# Patient Record
Sex: FEMALE | Race: OTHER | HISPANIC OR LATINO | URBAN - METROPOLITAN AREA
[De-identification: names, ages, dates, MRNs, and addresses within clinical notes are randomized per-mention and may not be internally consistent; named-entity substitution may affect disease eponyms.]

---

## 2017-03-23 ENCOUNTER — EMERGENCY (EMERGENCY)
Facility: HOSPITAL | Age: 67
LOS: 1 days | Discharge: HOME | End: 2017-03-23
Attending: EMERGENCY MEDICINE
Payer: MEDICAID

## 2017-03-23 VITALS
HEIGHT: 62 IN | RESPIRATION RATE: 20 BRPM | WEIGHT: 179.9 LBS | DIASTOLIC BLOOD PRESSURE: 78 MMHG | SYSTOLIC BLOOD PRESSURE: 153 MMHG | TEMPERATURE: 98 F | OXYGEN SATURATION: 97 % | HEART RATE: 74 BPM

## 2017-03-23 DIAGNOSIS — R05 COUGH: ICD-10-CM

## 2017-03-23 DIAGNOSIS — E11.9 TYPE 2 DIABETES MELLITUS WITHOUT COMPLICATIONS: ICD-10-CM

## 2017-03-23 DIAGNOSIS — E78.00 PURE HYPERCHOLESTEROLEMIA, UNSPECIFIED: ICD-10-CM

## 2017-03-23 DIAGNOSIS — E03.9 HYPOTHYROIDISM, UNSPECIFIED: ICD-10-CM

## 2017-03-23 PROCEDURE — 99283 EMERGENCY DEPT VISIT LOW MDM: CPT | Mod: 25

## 2017-03-23 PROCEDURE — T1013: CPT

## 2017-03-23 PROCEDURE — 94640 AIRWAY INHALATION TREATMENT: CPT

## 2017-03-23 PROCEDURE — 99284 EMERGENCY DEPT VISIT MOD MDM: CPT | Mod: 25

## 2017-03-23 RX ORDER — ALBUTEROL 90 UG/1
2.5 AEROSOL, METERED ORAL ONCE
Qty: 0 | Refills: 0 | Status: COMPLETED | OUTPATIENT
Start: 2017-03-23 | End: 2017-03-23

## 2017-03-23 RX ORDER — ALBUTEROL 90 UG/1
2 AEROSOL, METERED ORAL
Qty: 1 | Refills: 0 | OUTPATIENT
Start: 2017-03-23

## 2017-03-23 RX ADMIN — ALBUTEROL 2.5 MILLIGRAM(S): 90 AEROSOL, METERED ORAL at 02:43

## 2017-03-23 NOTE — ED PROVIDER NOTE - OBJECTIVE STATEMENT
65 y/o female complaining of itchy throat/cough/congestion/aches for 2 weeks, denies fever/chills/abd pain/back pain/CP/SOB/palpitations/anorexia. States she went to PCP today and was placed on Zpack today.

## 2017-03-23 NOTE — ED PROVIDER NOTE - ATTENDING CONTRIBUTION TO CARE
65 yo F presents to ED c/o dry cough, nasal congestion and itchy throat x 2 weeks.  Pt see in clinic and Rx'd with Z-dioni without relief of cough.  On exam afebrile, pt in no acute resp distress.  Lungs with rare occ exp wheeze.  Rx neb, continue Z-dioni and Rx Tessalon

## 2017-03-23 NOTE — ED ADULT NURSE NOTE - OBJECTIVE STATEMENT
pt A&Ox3, fever/chills, productive cough with green phlegm , difficulty breathing, headache and generalized bidy aches x2 weeks went to clinic today and was given zpack symptoms  increased tonight

## 2017-03-23 NOTE — ED ADULT TRIAGE NOTE - CHIEF COMPLAINT QUOTE
patient states having cough and chills that started 2 weeks ago. patient weas seen at clinic and started on z-pack but states feeling worse

## 2017-05-04 ENCOUNTER — EMERGENCY (EMERGENCY)
Facility: HOSPITAL | Age: 67
LOS: 1 days | Discharge: HOME | End: 2017-05-04
Attending: EMERGENCY MEDICINE
Payer: MEDICAID

## 2017-05-04 VITALS
DIASTOLIC BLOOD PRESSURE: 76 MMHG | OXYGEN SATURATION: 98 % | WEIGHT: 184.09 LBS | HEIGHT: 62 IN | TEMPERATURE: 98 F | SYSTOLIC BLOOD PRESSURE: 156 MMHG | HEART RATE: 76 BPM | RESPIRATION RATE: 20 BRPM

## 2017-05-04 DIAGNOSIS — M25.531 PAIN IN RIGHT WRIST: ICD-10-CM

## 2017-05-04 DIAGNOSIS — E03.9 HYPOTHYROIDISM, UNSPECIFIED: ICD-10-CM

## 2017-05-04 DIAGNOSIS — M65.4 RADIAL STYLOID TENOSYNOVITIS [DE QUERVAIN]: ICD-10-CM
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
## 2017-05-04 DIAGNOSIS — I10 ESSENTIAL (PRIMARY) HYPERTENSION: ICD-10-CM
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
## 2017-05-04 DIAGNOSIS — E78.00 PURE HYPERCHOLESTEROLEMIA, UNSPECIFIED: ICD-10-CM

## 2017-05-04 PROCEDURE — 73110 X-RAY EXAM OF WRIST: CPT

## 2017-05-04 PROCEDURE — 73110 X-RAY EXAM OF WRIST: CPT | Mod: 26,RT

## 2017-05-04 PROCEDURE — 29125 APPL SHORT ARM SPLINT STATIC: CPT

## 2017-05-04 PROCEDURE — T1013: CPT

## 2017-05-04 PROCEDURE — 99283 EMERGENCY DEPT VISIT LOW MDM: CPT | Mod: 25

## 2017-05-04 PROCEDURE — 29125 APPL SHORT ARM SPLINT STATIC: CPT | Mod: RT

## 2017-05-04 RX ORDER — IBUPROFEN 200 MG
600 TABLET ORAL ONCE
Qty: 0 | Refills: 0 | Status: COMPLETED | OUTPATIENT
Start: 2017-05-04 | End: 2017-05-04

## 2017-05-04 RX ADMIN — Medication 600 MILLIGRAM(S): at 18:29

## 2017-05-04 NOTE — ED STATDOCS - PROGRESS NOTE DETAILS
Pts xrays negative for acute bony pathology--spica velcro splint applied for comfort. Pt given ortho to f/u with and is stable for d/c.

## 2017-05-04 NOTE — ED STATDOCS - OBJECTIVE STATEMENT
65 y/o F pt w/ PMHx of HTN and DM presents to the ED c/o R wrist pain onset yesterday. Pt states that she felt the onset of sx while in the shower. Pt denies trauma, fall, numbness/tingling, focal weakness, deformity, or any other complaints. NKDA.

## 2017-05-04 NOTE — ED ADULT TRIAGE NOTE - CHIEF COMPLAINT QUOTE
Patient arrived to ED today with c/o right wrist pain, and swelling.  Patient arrived from [facility] care for evaluation.

## 2017-05-04 NOTE — ED STATDOCS - NS ED MD SCRIBE ATTENDING SCRIBE SECTIONS
HIV/VITAL SIGNS( Pullset)/REVIEW OF SYSTEMS/DISPOSITION/PAST MEDICAL/SURGICAL/SOCIAL HISTORY/PHYSICAL EXAM/HISTORY OF PRESENT ILLNESS

## 2017-05-04 NOTE — ED PROCEDURE NOTE - CPROC ED POST PROC CARE GUIDE1
Instructed patient/caregiver to follow-up with primary care physician./Elevate the injured extremity as instructed./Verbal/written post procedure instructions were given to patient/caregiver./Instructed patient/caregiver regarding signs and symptoms of infection./Keep the cast/splint/dressing clean and dry.

## 2017-05-04 NOTE — ED STATDOCS - ATTENDING CONTRIBUTION TO CARE
I, Frank Cardenas, performed the initial face to face bedside interview with this patient regarding history of present illness, review of symptoms and relevant past medical, social and family history.  I completed an independent physical examination.  I was the initial provider who evaluated this patient. I have signed out the follow up of any pending tests (i.e. labs, radiological studies) to the ACP.  I have communicated the patient’s plan of care and disposition with the ACP.  The history, relevant review of systems, past medical and surgical history, medical decision making, and physical examination was documented by the scribe in my presence and I attest to the accuracy of the documentation.

## 2017-05-11 ENCOUNTER — APPOINTMENT (OUTPATIENT)
Dept: ORTHOPEDIC SURGERY | Facility: CLINIC | Age: 67
End: 2017-05-11

## 2017-07-22 ENCOUNTER — EMERGENCY (EMERGENCY)
Facility: HOSPITAL | Age: 67
LOS: 1 days | End: 2017-07-22
Attending: EMERGENCY MEDICINE | Admitting: EMERGENCY MEDICINE
Payer: MEDICAID

## 2017-07-22 VITALS
OXYGEN SATURATION: 100 % | DIASTOLIC BLOOD PRESSURE: 76 MMHG | TEMPERATURE: 98 F | HEIGHT: 61 IN | RESPIRATION RATE: 18 BRPM | HEART RATE: 68 BPM | WEIGHT: 182.1 LBS | SYSTOLIC BLOOD PRESSURE: 153 MMHG

## 2017-07-22 PROCEDURE — 99284 EMERGENCY DEPT VISIT MOD MDM: CPT | Mod: 25

## 2017-07-22 NOTE — ED ADULT TRIAGE NOTE - CHIEF COMPLAINT QUOTE
pt c/o fall tripped in the shoes, fell on right shoulder and hit her head, happened about 11 am denies blood thinners

## 2017-07-23 VITALS
OXYGEN SATURATION: 98 % | DIASTOLIC BLOOD PRESSURE: 64 MMHG | TEMPERATURE: 98 F | HEART RATE: 71 BPM | RESPIRATION RATE: 18 BRPM | SYSTOLIC BLOOD PRESSURE: 143 MMHG

## 2017-07-23 PROCEDURE — 96372 THER/PROPH/DIAG INJ SC/IM: CPT

## 2017-07-23 PROCEDURE — 73000 X-RAY EXAM OF COLLAR BONE: CPT

## 2017-07-23 PROCEDURE — 73030 X-RAY EXAM OF SHOULDER: CPT

## 2017-07-23 PROCEDURE — 73000 X-RAY EXAM OF COLLAR BONE: CPT | Mod: 26,RT

## 2017-07-23 PROCEDURE — 73030 X-RAY EXAM OF SHOULDER: CPT | Mod: 26,RT

## 2017-07-23 PROCEDURE — 99284 EMERGENCY DEPT VISIT MOD MDM: CPT | Mod: 25

## 2017-07-23 RX ORDER — LOSARTAN POTASSIUM 100 MG/1
1 TABLET, FILM COATED ORAL
Qty: 0 | Refills: 0 | COMMUNITY

## 2017-07-23 RX ORDER — TRAMADOL HYDROCHLORIDE 50 MG/1
1 TABLET ORAL
Qty: 9 | Refills: 0 | OUTPATIENT
Start: 2017-07-23 | End: 2017-07-26

## 2017-07-23 RX ORDER — KETOROLAC TROMETHAMINE 30 MG/ML
30 SYRINGE (ML) INJECTION ONCE
Qty: 0 | Refills: 0 | Status: DISCONTINUED | OUTPATIENT
Start: 2017-07-23 | End: 2017-07-23

## 2017-07-23 RX ORDER — LEVOTHYROXINE SODIUM 125 MCG
1 TABLET ORAL
Qty: 0 | Refills: 0 | COMMUNITY

## 2017-07-23 RX ORDER — CYCLOBENZAPRINE HYDROCHLORIDE 10 MG/1
1 TABLET, FILM COATED ORAL
Qty: 9 | Refills: 0 | OUTPATIENT
Start: 2017-07-23 | End: 2017-07-26

## 2017-07-23 RX ORDER — OXYCODONE HYDROCHLORIDE 5 MG/1
5 TABLET ORAL ONCE
Qty: 0 | Refills: 0 | Status: DISCONTINUED | OUTPATIENT
Start: 2017-07-23 | End: 2017-07-23

## 2017-07-23 RX ORDER — CYCLOBENZAPRINE HYDROCHLORIDE 10 MG/1
10 TABLET, FILM COATED ORAL ONCE
Qty: 0 | Refills: 0 | Status: COMPLETED | OUTPATIENT
Start: 2017-07-23 | End: 2017-07-23

## 2017-07-23 RX ADMIN — Medication 30 MILLIGRAM(S): at 03:02

## 2017-07-23 RX ADMIN — CYCLOBENZAPRINE HYDROCHLORIDE 10 MILLIGRAM(S): 10 TABLET, FILM COATED ORAL at 03:02

## 2017-07-23 RX ADMIN — OXYCODONE HYDROCHLORIDE 5 MILLIGRAM(S): 5 TABLET ORAL at 03:02

## 2017-07-23 NOTE — ED PROVIDER NOTE - OBJECTIVE STATEMENT
66 y/o F PMHx DM presents to ED c/o R shoulder pain s/p mechanical fall. Pt does not speak english, son is translating. Son reports mother tripped and fell around 1100, was able to ambulate on her own. Pt believed pain would subside, came to ED due to pain. Denies kidney problems, LOC, head trauma, N/V/D, fever, chills, SOB, CP, difficulty breathing, HA, numbness, tingling and abd pain.

## 2017-07-23 NOTE — ED PROVIDER NOTE - NS ED ROS FT
no weight change, no fever or chills  no rash, no bruises  no visual changes no eye discharge  no cough cold or congestion,   no sob, no chest pain  no orthopnea, no pnd  no abd pain, no n/v/d  no hematuria, no change in urinary habits  positive joint pain, no deformity  no headache, no paresthesia   positive R shoulder pain

## 2017-07-23 NOTE — ED PROVIDER NOTE - PHYSICAL EXAMINATION
sitting comfortably, talking in full sentences  pupilsa reactive, eomi,   mm moist, no oral injury, no facial pain  supple, no c/t/l spine tenderness, from, trachea in midline  H[place]reet chest expansion, no cheat wall tenderness, no rib tenderness, no deformity, no clavicular pain  S1 S2 distant  abd soft, non tender, no g/r,   no pelvic pain  moves all ext, no swelling noted  Neuro aao3, cn intact grossly, no focal deficits  skin no bruises, no swelling   tenderness to proximal humerus. distal sensory capillary intact. no hematoma.

## 2018-08-02 ENCOUNTER — APPOINTMENT (OUTPATIENT)
Dept: GASTROENTEROLOGY | Facility: CLINIC | Age: 68
End: 2018-08-02
Payer: MEDICAID

## 2018-08-02 VITALS
HEART RATE: 76 BPM | RESPIRATION RATE: 16 BRPM | OXYGEN SATURATION: 97 % | WEIGHT: 166 LBS | SYSTOLIC BLOOD PRESSURE: 124 MMHG | DIASTOLIC BLOOD PRESSURE: 66 MMHG | BODY MASS INDEX: 31.34 KG/M2 | HEIGHT: 61 IN

## 2018-08-02 PROBLEM — I10 ESSENTIAL (PRIMARY) HYPERTENSION: Chronic | Status: ACTIVE | Noted: 2017-05-05

## 2018-08-02 PROCEDURE — 82272 OCCULT BLD FECES 1-3 TESTS: CPT

## 2018-08-02 PROCEDURE — 99204 OFFICE O/P NEW MOD 45 MIN: CPT

## 2018-08-02 RX ORDER — POLYETHYLENE GLYCOL 3350 17 G/17G
17 POWDER, FOR SOLUTION ORAL DAILY
Qty: 3 | Refills: 1 | Status: ACTIVE | COMMUNITY
Start: 2018-08-02 | End: 1900-01-01

## 2018-09-14 ENCOUNTER — RESULT REVIEW (OUTPATIENT)
Age: 68
End: 2018-09-14

## 2018-10-15 ENCOUNTER — APPOINTMENT (OUTPATIENT)
Dept: GASTROENTEROLOGY | Facility: GI CENTER | Age: 68
End: 2018-10-15
Payer: MEDICAID

## 2018-10-15 ENCOUNTER — OUTPATIENT (OUTPATIENT)
Dept: OUTPATIENT SERVICES | Facility: HOSPITAL | Age: 68
LOS: 1 days | End: 2018-10-15
Payer: MEDICAID

## 2018-10-15 DIAGNOSIS — K59.01 SLOW TRANSIT CONSTIPATION: ICD-10-CM

## 2018-10-15 DIAGNOSIS — Z12.11 ENCOUNTER FOR SCREENING FOR MALIGNANT NEOPLASM OF COLON: ICD-10-CM

## 2018-10-15 LAB — GLUCOSE BLDC GLUCOMTR-MCNC: 188 MG/DL — HIGH (ref 70–99)

## 2018-10-15 PROCEDURE — G0121: CPT

## 2018-10-15 PROCEDURE — 82962 GLUCOSE BLOOD TEST: CPT

## 2018-10-15 PROCEDURE — 45378 DIAGNOSTIC COLONOSCOPY: CPT

## 2018-10-29 ENCOUNTER — RESULT REVIEW (OUTPATIENT)
Age: 68
End: 2018-10-29

## 2018-10-29 ENCOUNTER — OUTPATIENT (OUTPATIENT)
Dept: OUTPATIENT SERVICES | Facility: HOSPITAL | Age: 68
LOS: 1 days | End: 2018-10-29
Payer: MEDICAID

## 2018-10-29 ENCOUNTER — APPOINTMENT (OUTPATIENT)
Dept: GASTROENTEROLOGY | Facility: GI CENTER | Age: 68
End: 2018-10-29
Payer: MEDICAID

## 2018-10-29 DIAGNOSIS — K21.9 GASTRO-ESOPHAGEAL REFLUX DISEASE WITHOUT ESOPHAGITIS: ICD-10-CM

## 2018-10-29 DIAGNOSIS — Z00.00 ENCOUNTER FOR GENERAL ADULT MEDICAL EXAMINATION W/OUT ABNORMAL FINDINGS: ICD-10-CM

## 2018-10-29 LAB — GLUCOSE BLDC GLUCOMTR-MCNC: 146 MG/DL — HIGH (ref 70–99)

## 2018-10-29 PROCEDURE — 82962 GLUCOSE BLOOD TEST: CPT

## 2018-10-29 PROCEDURE — 43239 EGD BIOPSY SINGLE/MULTIPLE: CPT

## 2018-10-29 PROCEDURE — 88305 TISSUE EXAM BY PATHOLOGIST: CPT

## 2018-10-29 PROCEDURE — 88305 TISSUE EXAM BY PATHOLOGIST: CPT | Mod: 26

## 2018-10-29 PROCEDURE — 88342 IMHCHEM/IMCYTCHM 1ST ANTB: CPT | Mod: 26

## 2018-10-29 PROCEDURE — 88342 IMHCHEM/IMCYTCHM 1ST ANTB: CPT

## 2018-10-31 LAB — SURGICAL PATHOLOGY FINAL REPORT - CH: SIGNIFICANT CHANGE UP

## 2019-09-18 ENCOUNTER — RESULT REVIEW (OUTPATIENT)
Age: 69
End: 2019-09-18

## 2019-09-19 ENCOUNTER — APPOINTMENT (OUTPATIENT)
Dept: DERMATOLOGY | Facility: CLINIC | Age: 69
End: 2019-09-19
Payer: MEDICAID

## 2019-09-19 PROCEDURE — 99202 OFFICE O/P NEW SF 15 MIN: CPT | Mod: 25

## 2019-09-19 PROCEDURE — 11102 TANGNTL BX SKIN SINGLE LES: CPT

## 2020-02-20 ENCOUNTER — APPOINTMENT (OUTPATIENT)
Dept: DERMATOLOGY | Facility: CLINIC | Age: 70
End: 2020-02-20

## 2020-03-05 ENCOUNTER — APPOINTMENT (OUTPATIENT)
Dept: DERMATOLOGY | Facility: CLINIC | Age: 70
End: 2020-03-05
Payer: MEDICAID

## 2020-03-05 PROCEDURE — 99213 OFFICE O/P EST LOW 20 MIN: CPT

## 2020-06-04 ENCOUNTER — APPOINTMENT (OUTPATIENT)
Dept: DERMATOLOGY | Facility: CLINIC | Age: 70
End: 2020-06-04
Payer: MEDICAID

## 2020-06-04 DIAGNOSIS — C44.311 BASAL CELL CARCINOMA OF SKIN OF NOSE: ICD-10-CM

## 2020-06-04 PROCEDURE — 13152 CMPLX RPR E/N/E/L 2.6-7.5 CM: CPT

## 2020-06-04 PROCEDURE — 17312 MOHS ADDL STAGE: CPT

## 2020-06-04 PROCEDURE — 17311 MOHS 1 STAGE H/N/HF/G: CPT

## 2020-12-16 PROBLEM — Z12.11 ENCOUNTER FOR SCREENING COLONOSCOPY: Status: RESOLVED | Noted: 2018-10-15 | Resolved: 2020-12-16

## 2021-06-15 ENCOUNTER — APPOINTMENT (OUTPATIENT)
Dept: GASTROENTEROLOGY | Facility: CLINIC | Age: 71
End: 2021-06-15
Payer: MEDICAID

## 2021-06-15 VITALS
HEIGHT: 61 IN | SYSTOLIC BLOOD PRESSURE: 150 MMHG | DIASTOLIC BLOOD PRESSURE: 86 MMHG | WEIGHT: 159 LBS | OXYGEN SATURATION: 98 % | RESPIRATION RATE: 16 BRPM | HEART RATE: 92 BPM | TEMPERATURE: 97.7 F | BODY MASS INDEX: 30.02 KG/M2

## 2021-06-15 DIAGNOSIS — Z78.9 OTHER SPECIFIED HEALTH STATUS: ICD-10-CM

## 2021-06-15 DIAGNOSIS — K21.9 GASTRO-ESOPHAGEAL REFLUX DISEASE W/OUT ESOPHAGITIS: ICD-10-CM

## 2021-06-15 DIAGNOSIS — K59.01 SLOW TRANSIT CONSTIPATION: ICD-10-CM

## 2021-06-15 DIAGNOSIS — E11.9 TYPE 2 DIABETES MELLITUS W/OUT COMPLICATIONS: ICD-10-CM

## 2021-06-15 PROCEDURE — 99214 OFFICE O/P EST MOD 30 MIN: CPT

## 2021-06-15 NOTE — HISTORY OF PRESENT ILLNESS
[de-identified] : The patient arrived along with her niece for further evaluation.  She has been complaining of significant acid reflux and has been on omeprazole 40 mg.  She has been evaluated in the past by Dr. Estrellita Iglesias and has undergone a EGD and colonoscopy in 2018.  10-year follow-up of colonoscopy was recommended.  EGD revealed some gastritis with no evidence of any Helicobacter pylori infection.  The patient is denying any weight loss.  She is on medications for diabetes.  It is unclear whether she has a good control of diabetes or not.  She has occasional vomiting.  She also has constipation.  She is taking medications for constipation.

## 2021-06-15 NOTE — PHYSICAL EXAM
[General Appearance - Alert] : alert [General Appearance - In No Acute Distress] : in no acute distress [Sclera] : the sclera and conjunctiva were normal [PERRL With Normal Accommodation] : pupils were equal in size, round, and reactive to light [Extraocular Movements] : extraocular movements were intact [Oropharynx] : the oropharynx was normal [Outer Ear] : the ears and nose were normal in appearance [Neck Appearance] : the appearance of the neck was normal [Neck Cervical Mass (___cm)] : no neck mass was observed [Jugular Venous Distention Increased] : there was no jugular-venous distention [Thyroid Diffuse Enlargement] : the thyroid was not enlarged [Thyroid Nodule] : there were no palpable thyroid nodules [Auscultation Breath Sounds / Voice Sounds] : lungs were clear to auscultation bilaterally [Heart Rate And Rhythm] : heart rate was normal and rhythm regular [Heart Sounds] : normal S1 and S2 [Heart Sounds Gallop] : no gallops [Murmurs] : no murmurs [Heart Sounds Pericardial Friction Rub] : no pericardial rub [Arterial Pulses Carotid] : carotid pulses were normal with no bruits [Abdominal Aorta] : the abdominal aorta was normal [Bowel Sounds] : normal bowel sounds [Abdomen Soft] : soft [Abdomen Tenderness] : non-tender [Abdomen Mass (___ Cm)] : no abdominal mass palpated [Cervical Lymph Nodes Enlarged Anterior Bilaterally] : anterior cervical [Cervical Lymph Nodes Enlarged Posterior Bilaterally] : posterior cervical [No CVA Tenderness] : no ~M costovertebral angle tenderness [No Spinal Tenderness] : no spinal tenderness [Abnormal Walk] : normal gait [Musculoskeletal - Swelling] : no joint swelling seen [Nail Clubbing] : no clubbing  or cyanosis of the fingernails [Motor Tone] : muscle strength and tone were normal [Skin Color & Pigmentation] : normal skin color and pigmentation [Skin Turgor] : normal skin turgor [] : no rash [No Focal Deficits] : no focal deficits [Oriented To Time, Place, And Person] : oriented to person, place, and time [Impaired Insight] : insight and judgment were intact [Affect] : the affect was normal

## 2021-06-15 NOTE — ASSESSMENT
[FreeTextEntry1] : At this time, I have recommended to consider the evaluation for appropriate glycemic control and also perform the blood testing.  I have also recommended to consider nuclear medicine gastric emptying study to rule out any gastroparesis.  In the interim the patient should continue the PPI therapy.  If she however is doing okay with the glycemic control and there is no evidence of any gastroparesis, she may need a repeat upper endoscopy.  I may have to add PPI therapy along with H2 blocker therapy.  We will follow up on a short-term basis.

## 2021-06-29 ENCOUNTER — OUTPATIENT (OUTPATIENT)
Dept: OUTPATIENT SERVICES | Facility: HOSPITAL | Age: 71
LOS: 1 days | End: 2021-06-29
Payer: MEDICAID

## 2021-06-29 DIAGNOSIS — E11.9 TYPE 2 DIABETES MELLITUS WITHOUT COMPLICATIONS: ICD-10-CM

## 2021-06-29 DIAGNOSIS — K21.9 GASTRO-ESOPHAGEAL REFLUX DISEASE WITHOUT ESOPHAGITIS: ICD-10-CM

## 2021-06-29 LAB — GLUCOSE BLDC GLUCOMTR-MCNC: 126 MG/DL — HIGH (ref 70–99)

## 2021-06-29 PROCEDURE — 82962 GLUCOSE BLOOD TEST: CPT

## 2021-06-29 PROCEDURE — 78264 GASTRIC EMPTYING IMG STUDY: CPT | Mod: 26,GC

## 2021-06-29 PROCEDURE — 78264 GASTRIC EMPTYING IMG STUDY: CPT

## 2021-06-29 PROCEDURE — A9541: CPT

## 2021-07-08 ENCOUNTER — OUTPATIENT (OUTPATIENT)
Dept: OUTPATIENT SERVICES | Facility: HOSPITAL | Age: 71
LOS: 1 days | End: 2021-07-08
Payer: MEDICAID

## 2021-07-08 ENCOUNTER — APPOINTMENT (OUTPATIENT)
Dept: INFECTIOUS DISEASE | Facility: CLINIC | Age: 71
End: 2021-07-08
Payer: MEDICAID

## 2021-07-08 VITALS
DIASTOLIC BLOOD PRESSURE: 48 MMHG | HEART RATE: 76 BPM | SYSTOLIC BLOOD PRESSURE: 136 MMHG | TEMPERATURE: 98.8 F | OXYGEN SATURATION: 96 %

## 2021-07-08 DIAGNOSIS — N39.0 URINARY TRACT INFECTION, SITE NOT SPECIFIED: ICD-10-CM

## 2021-07-08 DIAGNOSIS — B97.89 OTHER VIRAL AGENTS AS THE CAUSE OF DISEASES CLASSIFIED ELSEWHERE: ICD-10-CM

## 2021-07-08 PROCEDURE — 99205 OFFICE O/P NEW HI 60 MIN: CPT

## 2021-07-08 PROCEDURE — G0463: CPT

## 2021-07-08 RX ORDER — FOSFOMYCIN TROMETHAMINE 3 G/1
3 POWDER ORAL
Qty: 2 | Refills: 0 | Status: ACTIVE | COMMUNITY
Start: 2021-07-08 | End: 1900-01-01

## 2021-07-08 NOTE — ASSESSMENT
[FreeTextEntry1] : Will try Fosfamycin for now, if she becomes asymptomatic after treatment, will wait until she has symptoms otherwise no need to repeat UA or UC. If fosfamycin not covered will send Microbid(in UC showed that it is sensitive) If she developes symptoms and has UTI again, then we will put her on prophylaxis with microbid. \par At any point if she has fever, flank pain or any new symptoms need to go to ED. \par Seen GYn and , with no issue such as prolapse or rectocele/ureterocele \par Patient was given the opportunity to ask questions and all questions were answered to their satisfaction.\par Counseling included lab results, differential diagnosis, treatment options, risks and benefits, lifestyle changes, current condition, medications and dose adjustments.\par The patient verbalized understanding.\par  [Treatment Education] : treatment education [Treatment Adherence] : treatment adherence [Rx Dose / Side Effects] : Rx dose/side effects [Risk Reduction] : risk reduction [Drug Interactions / Side Effects] : drug interactions/side effects [Disclosure of Diagnosis] : disclosure of diagnosis [Anticipatory Guidance] : anticipatory guidance

## 2021-07-08 NOTE — HISTORY OF PRESENT ILLNESS
[FreeTextEntry1] : 72y/o [place]ese speaking woman (accompanied by her niece . asking to interpret for us), with PMH of DM2 and GERD was referred to ID with recurrent UTI. \par She was hospitalized with UTI ESBL requiring IV antibiotics for which stayed in the hospital for 3-4 days and Continued Antibiotics (assuming carbapenem?) at home for total 7days at home. She had dysuria and suprapubic pain. As per niece had a negative abdominal CT but UA and UC were ESBL ecoli. \par UA and UC was repeated on 6/25 by  with high WBC in urine and Ecoli ESBL again so she was referred to ID. \par She has no fever or GI symptoms. States that sometimes has suprapubic pain with frequency. \par No smoking, ETOH or drugs \par

## 2021-09-02 RX ORDER — NITROFURANTOIN MACROCRYSTALS 100 MG/1
100 CAPSULE ORAL
Qty: 14 | Refills: 0 | Status: ACTIVE | COMMUNITY
Start: 2021-09-02 | End: 1900-01-01

## 2021-09-29 ENCOUNTER — NON-APPOINTMENT (OUTPATIENT)
Age: 71
End: 2021-09-29

## 2021-09-30 ENCOUNTER — OUTPATIENT (OUTPATIENT)
Dept: OUTPATIENT SERVICES | Facility: HOSPITAL | Age: 71
LOS: 1 days | End: 2021-09-30
Payer: MEDICAID

## 2021-09-30 ENCOUNTER — APPOINTMENT (OUTPATIENT)
Dept: INFECTIOUS DISEASE | Facility: CLINIC | Age: 71
End: 2021-09-30
Payer: MEDICAID

## 2021-09-30 VITALS
TEMPERATURE: 96.6 F | WEIGHT: 155 LBS | DIASTOLIC BLOOD PRESSURE: 79 MMHG | HEIGHT: 61 IN | OXYGEN SATURATION: 98 % | HEART RATE: 78 BPM | SYSTOLIC BLOOD PRESSURE: 158 MMHG | BODY MASS INDEX: 29.27 KG/M2

## 2021-09-30 DIAGNOSIS — B97.89 OTHER VIRAL AGENTS AS THE CAUSE OF DISEASES CLASSIFIED ELSEWHERE: ICD-10-CM

## 2021-09-30 PROCEDURE — 99213 OFFICE O/P EST LOW 20 MIN: CPT

## 2021-09-30 RX ORDER — FOSFOMYCIN TROMETHAMINE 3 G/1
3 POWDER ORAL
Qty: 1 | Refills: 0 | Status: ACTIVE | COMMUNITY
Start: 2021-09-30 | End: 1900-01-01

## 2021-09-30 RX ORDER — NITROFURANTOIN (MONOHYDRATE/MACROCRYSTALS) 25; 75 MG/1; MG/1
100 CAPSULE ORAL DAILY
Qty: 30 | Refills: 1 | Status: ACTIVE | COMMUNITY
Start: 2021-09-30 | End: 1900-01-01

## 2021-09-30 NOTE — ASSESSMENT
[FreeTextEntry1] : Advised about repeating IV ertapenem for 7days or so but at this point she declined and wants to try an oral option. \par Will start with one dose of Fosfamycin 3gm and then daily Nitrofurantoin 100mg daily, will see her in 6 weeks for follow up.\par Side effects discussed.\par At any time has fever or flank or midback pain needs to go to ED. \par If with this treatment still has symptomatic ESBL Ecoli, recommend treating with IV. \par \par Patient was given the opportunity to ask questions and all questions were answered to their satisfaction.\par Counseling included lab results, differential diagnosis, treatment options, risks and benefits, lifestyle changes, current condition, medications and dose adjustments.\par The patient verbalized understanding.\par  [Treatment Education] : treatment education [Treatment Adherence] : treatment adherence [Rx Dose / Side Effects] : Rx dose/side effects [Risk Reduction] : risk reduction [Universal Precautions] : universal precautions [Drug Interactions / Side Effects] : drug interactions/side effects [Anticipatory Guidance] : anticipatory guidance

## 2021-09-30 NOTE — HISTORY OF PRESENT ILLNESS
[FreeTextEntry1] : 70y/o American speaking woman (accompanied by her niece . asking to interpret for us), with PMH of DM2 and GERD was referred to ID with recurrent UTI. \par She was hospitalized with UTI ESBL requiring IV antibiotics for which stayed in the hospital for 3-4 days and Continued Antibiotics (assuming carbapenem?) for total 7days at home. She had dysuria and suprapubic pain. As per niece had a negative abdominal CT but UA and UC were ESBL ecoli. \par UA and UC was repeated on 6/25 by  with high WBC in urine and Ecoli ESBL again so she was referred to ID. \par I sent Nitrofurantoin on 9/2/21 again but as per theodore seen her urologist and is still positive. \par She has no fever or GI symptoms. States that sometimes has dysuria and frequency. \par No smoking, ETOH or drugs \par

## 2021-10-01 DIAGNOSIS — N39.0 URINARY TRACT INFECTION, SITE NOT SPECIFIED: ICD-10-CM

## 2021-10-04 ENCOUNTER — NON-APPOINTMENT (OUTPATIENT)
Age: 71
End: 2021-10-04

## 2021-10-05 ENCOUNTER — NON-APPOINTMENT (OUTPATIENT)
Age: 71
End: 2021-10-05

## 2022-01-07 ENCOUNTER — NON-APPOINTMENT (OUTPATIENT)
Age: 72
End: 2022-01-07

## 2022-01-20 ENCOUNTER — APPOINTMENT (OUTPATIENT)
Dept: INFECTIOUS DISEASE | Facility: CLINIC | Age: 72
End: 2022-01-20
Payer: MEDICAID

## 2022-01-20 ENCOUNTER — LABORATORY RESULT (OUTPATIENT)
Age: 72
End: 2022-01-20

## 2022-01-20 VITALS
BODY MASS INDEX: 30.58 KG/M2 | HEART RATE: 83 BPM | DIASTOLIC BLOOD PRESSURE: 84 MMHG | RESPIRATION RATE: 16 BRPM | WEIGHT: 162 LBS | TEMPERATURE: 98 F | HEIGHT: 61 IN | OXYGEN SATURATION: 98 % | SYSTOLIC BLOOD PRESSURE: 148 MMHG

## 2022-01-20 DIAGNOSIS — Z16.12 URINARY TRACT INFECTION, SITE NOT SPECIFIED: ICD-10-CM

## 2022-01-20 DIAGNOSIS — N39.0 URINARY TRACT INFECTION, SITE NOT SPECIFIED: ICD-10-CM

## 2022-01-20 DIAGNOSIS — B96.29 URINARY TRACT INFECTION, SITE NOT SPECIFIED: ICD-10-CM

## 2022-01-20 PROCEDURE — G0463: CPT

## 2022-01-20 PROCEDURE — 81001 URINALYSIS AUTO W/SCOPE: CPT

## 2022-01-20 PROCEDURE — 87086 URINE CULTURE/COLONY COUNT: CPT

## 2022-01-20 PROCEDURE — 99213 OFFICE O/P EST LOW 20 MIN: CPT

## 2022-01-20 NOTE — REVIEW OF SYSTEMS
[Fever] : no fever [Chills] : chills [Body Aches] : no body aches [Difficulty Sleeping] : no difficulty sleeping [Feeling Tired] : not feeling tired [Feeling Sick] : not feeling sick [Normal Appetite] : normal appetite [Negative] : Heme/Lymph [FreeTextEntry7] : suprapubic discomfort

## 2022-01-20 NOTE — ASSESSMENT
[FreeTextEntry1] : Will do UA and UC and based on result will decide about the treatment. If develops fever or any other symptoms before results are ready, will go to ED. \par If ESBL again, may offer fosfamycin again or IV ertapenem, will discuss with them after results are in. \par \par Patient was given the opportunity to ask questions and all questions were answered to their satisfaction.\par Counseling included lab results, differential diagnosis, treatment options, risks and benefits, lifestyle changes, current condition, medications and dose adjustments.\par The patient verbalized understanding.\par  [Treatment Education] : treatment education [Risk Reduction] : risk reduction [Universal Precautions] : universal precautions [Anticipatory Guidance] : anticipatory guidance

## 2022-01-20 NOTE — HISTORY OF PRESENT ILLNESS
[FreeTextEntry1] : 72y/o [place] speaking woman (accompanied by her niece . asking to interpret for us), with PMH of DM2 and GERD and recurrent UTI with ESBL Ecoli in the past, is here for follow up. \par In the past she has been hospitalized with UTI ESBL requiring IV antibiotics (carbapenem) for which stayed in the hospital for 3-4 days. \par She was seen by me in 7/2021 with the same issue, and received fosfamycin with good response. \par Today she is complainign of chills but no fever, with suprapubic discomfort. As per theodore she complains about chills all the time. No dysuria but is taking phenazopyridine.  \par She has no GI symptoms, no vaginal disharge or bleeding. \par No smoking, ETOH or drugs \par

## 2022-01-21 LAB
APPEARANCE UR: CLEAR — SIGNIFICANT CHANGE UP
BACTERIA # UR AUTO: ABNORMAL
BILIRUB UR-MCNC: NEGATIVE — SIGNIFICANT CHANGE UP
COLOR SPEC: YELLOW — SIGNIFICANT CHANGE UP
DIFF PNL FLD: NEGATIVE — SIGNIFICANT CHANGE UP
EPI CELLS # UR: 5 /HPF — SIGNIFICANT CHANGE UP (ref 0–5)
GLUCOSE UR QL: SIGNIFICANT CHANGE UP
HYALINE CASTS # UR AUTO: 1 /LPF — SIGNIFICANT CHANGE UP (ref 0–7)
KETONES UR-MCNC: NEGATIVE — SIGNIFICANT CHANGE UP
LEUKOCYTE ESTERASE UR-ACNC: NEGATIVE — SIGNIFICANT CHANGE UP
NITRITE UR-MCNC: NEGATIVE — SIGNIFICANT CHANGE UP
PH UR: 7.5 — SIGNIFICANT CHANGE UP (ref 5–8)
PROT UR-MCNC: SIGNIFICANT CHANGE UP
RBC CASTS # UR COMP ASSIST: 8 /HPF — HIGH (ref 0–4)
SP GR SPEC: 1.02 — SIGNIFICANT CHANGE UP (ref 1.01–1.02)
UROBILINOGEN FLD QL: SIGNIFICANT CHANGE UP
WBC UR QL: 1 /HPF — SIGNIFICANT CHANGE UP (ref 0–5)

## 2022-01-22 LAB
CULTURE RESULTS: SIGNIFICANT CHANGE UP
SPECIMEN SOURCE: SIGNIFICANT CHANGE UP

## 2023-01-25 ENCOUNTER — OFFICE (OUTPATIENT)
Dept: URBAN - METROPOLITAN AREA CLINIC 94 | Facility: CLINIC | Age: 73
Setting detail: OPHTHALMOLOGY
End: 2023-01-25
Payer: COMMERCIAL

## 2023-01-25 DIAGNOSIS — E11.3313: ICD-10-CM

## 2023-01-25 DIAGNOSIS — E11.3311: ICD-10-CM

## 2023-01-25 PROCEDURE — 92235 FLUORESCEIN ANGRPH MLTIFRAME: CPT | Performed by: OPHTHALMOLOGY

## 2023-01-25 PROCEDURE — 67210 TREATMENT OF RETINAL LESION: CPT | Performed by: OPHTHALMOLOGY

## 2023-01-25 PROCEDURE — 92134 CPTRZ OPH DX IMG PST SGM RTA: CPT | Performed by: OPHTHALMOLOGY

## 2023-01-25 ASSESSMENT — REFRACTION_AUTOREFRACTION
OD_SPHERE: 0.00
OS_AXIS: 106
OS_CYLINDER: -0.25
OS_SPHERE: 0.00
OD_AXIS: 054
OD_CYLINDER: -0.25

## 2023-01-25 ASSESSMENT — KERATOMETRY
METHOD_AUTO_MANUAL: AUTO
OD_K1POWER_DIOPTERS: 45.00
OS_AXISANGLE_DEGREES: 079
OS_K2POWER_DIOPTERS: 45.75
OS_K1POWER_DIOPTERS: 44.75
OD_AXISANGLE_DEGREES: 100
OD_K2POWER_DIOPTERS: 45.75

## 2023-01-25 ASSESSMENT — TONOMETRY
OD_IOP_MMHG: 16
OS_IOP_MMHG: 17

## 2023-01-25 ASSESSMENT — VISUAL ACUITY
OS_BCVA: 20/40+1
OD_BCVA: 20/30

## 2023-01-25 ASSESSMENT — CONFRONTATIONAL VISUAL FIELD TEST (CVF)
OD_FINDINGS: FULL
OS_FINDINGS: FULL

## 2023-01-25 ASSESSMENT — AXIALLENGTH_DERIVED
OS_AL: 23.0124
OD_AL: 22.9688

## 2023-01-25 ASSESSMENT — SPHEQUIV_DERIVED
OS_SPHEQUIV: -0.125
OD_SPHEQUIV: -0.125

## 2023-01-25 ASSESSMENT — CORNEAL PTERYGIUM: OD_PTERYGIUM: TEMPORAL 1MM

## 2023-01-25 ASSESSMENT — LID EXAM ASSESSMENTS
OD_BLEPHARITIS: RLL RUL
OS_BLEPHARITIS: LLL LUL

## 2023-02-14 ENCOUNTER — ASC (OUTPATIENT)
Dept: URBAN - METROPOLITAN AREA SURGERY 8 | Facility: SURGERY | Age: 73
Setting detail: OPHTHALMOLOGY
End: 2023-02-14
Payer: COMMERCIAL

## 2023-02-14 DIAGNOSIS — E11.3312: ICD-10-CM

## 2023-02-14 PROCEDURE — 67210 TREATMENT OF RETINAL LESION: CPT | Performed by: OPHTHALMOLOGY

## 2023-02-14 ASSESSMENT — REFRACTION_AUTOREFRACTION
OD_CYLINDER: -0.25
OS_AXIS: 106
OD_SPHERE: 0.00
OS_CYLINDER: -0.25
OS_SPHERE: 0.00
OD_AXIS: 054

## 2023-02-14 ASSESSMENT — KERATOMETRY
METHOD_AUTO_MANUAL: AUTO
OD_AXISANGLE_DEGREES: 100
OD_K1POWER_DIOPTERS: 45.00
OD_K2POWER_DIOPTERS: 45.75
OS_K1POWER_DIOPTERS: 44.75
OS_AXISANGLE_DEGREES: 079
OS_K2POWER_DIOPTERS: 45.75

## 2023-02-14 ASSESSMENT — TONOMETRY
OD_IOP_MMHG: 15
OS_IOP_MMHG: 17

## 2023-02-14 ASSESSMENT — CORNEAL PTERYGIUM: OD_PTERYGIUM: TEMPORAL 1MM

## 2023-02-14 ASSESSMENT — LID EXAM ASSESSMENTS
OD_BLEPHARITIS: RLL RUL
OS_BLEPHARITIS: LLL LUL

## 2023-02-14 ASSESSMENT — VISUAL ACUITY
OS_BCVA: 20/40
OD_BCVA: 20/40

## 2023-02-14 ASSESSMENT — AXIALLENGTH_DERIVED
OD_AL: 22.9688
OS_AL: 23.0124

## 2023-02-14 ASSESSMENT — SPHEQUIV_DERIVED
OS_SPHEQUIV: -0.125
OD_SPHEQUIV: -0.125

## 2023-04-16 NOTE — ED PROVIDER NOTE - PROGRESS NOTE DETAILS
none
Pt re-assessed, wheezing resolved, pt "feels better." advised to rest, continue meds as directed, f/u with pcp in 1-2 days. if any symptoms worsen or any new symptoms occur, return to ED, pt verbalized understanding.

## 2023-05-16 ENCOUNTER — OFFICE (OUTPATIENT)
Dept: URBAN - METROPOLITAN AREA CLINIC 94 | Facility: CLINIC | Age: 73
Setting detail: OPHTHALMOLOGY
End: 2023-05-16
Payer: COMMERCIAL

## 2023-05-16 ENCOUNTER — ASC (OUTPATIENT)
Dept: URBAN - METROPOLITAN AREA SURGERY 8 | Facility: SURGERY | Age: 73
Setting detail: OPHTHALMOLOGY
End: 2023-05-16
Payer: COMMERCIAL

## 2023-05-16 DIAGNOSIS — E11.3311: ICD-10-CM

## 2023-05-16 DIAGNOSIS — H35.343: ICD-10-CM

## 2023-05-16 DIAGNOSIS — E11.3313: ICD-10-CM

## 2023-05-16 PROCEDURE — 92134 CPTRZ OPH DX IMG PST SGM RTA: CPT | Performed by: OPHTHALMOLOGY

## 2023-05-16 PROCEDURE — 92235 FLUORESCEIN ANGRPH MLTIFRAME: CPT | Performed by: OPHTHALMOLOGY

## 2023-05-16 PROCEDURE — 67210 TREATMENT OF RETINAL LESION: CPT | Performed by: OPHTHALMOLOGY

## 2023-05-16 ASSESSMENT — LID EXAM ASSESSMENTS
OD_BLEPHARITIS: RLL RUL
OS_BLEPHARITIS: LLL LUL

## 2023-05-16 ASSESSMENT — CORNEAL PTERYGIUM: OD_PTERYGIUM: TEMPORAL 1MM

## 2023-05-16 ASSESSMENT — REFRACTION_AUTOREFRACTION
OS_SPHERE: 0.00
OS_AXIS: 106
OD_AXIS: 054
OD_CYLINDER: -0.25
OD_SPHERE: 0.00
OS_CYLINDER: -0.25

## 2023-05-16 ASSESSMENT — KERATOMETRY
OD_K1POWER_DIOPTERS: 45.00
OS_K1POWER_DIOPTERS: 44.75
OD_AXISANGLE_DEGREES: 100
OS_K2POWER_DIOPTERS: 45.75
OS_AXISANGLE_DEGREES: 079
OD_K2POWER_DIOPTERS: 45.75
METHOD_AUTO_MANUAL: AUTO

## 2023-05-16 ASSESSMENT — SPHEQUIV_DERIVED
OS_SPHEQUIV: -0.125
OD_SPHEQUIV: -0.125

## 2023-05-16 ASSESSMENT — AXIALLENGTH_DERIVED
OS_AL: 23.0124
OD_AL: 22.9688

## 2023-05-16 ASSESSMENT — VISUAL ACUITY
OD_BCVA: 20/40+1
OS_BCVA: 20/40+1

## 2023-05-16 ASSESSMENT — CONFRONTATIONAL VISUAL FIELD TEST (CVF)
OS_FINDINGS: FULL
OD_FINDINGS: FULL

## 2023-05-31 ENCOUNTER — OFFICE (OUTPATIENT)
Dept: URBAN - METROPOLITAN AREA CLINIC 94 | Facility: CLINIC | Age: 73
Setting detail: OPHTHALMOLOGY
End: 2023-05-31
Payer: COMMERCIAL

## 2023-05-31 DIAGNOSIS — E11.3312: ICD-10-CM

## 2023-05-31 PROBLEM — H35.343 MACULAR CYST, HOLE; BOTH EYES: Status: ACTIVE | Noted: 2023-05-16

## 2023-05-31 PROCEDURE — 67210 TREATMENT OF RETINAL LESION: CPT | Performed by: OPHTHALMOLOGY

## 2023-05-31 ASSESSMENT — REFRACTION_AUTOREFRACTION
OD_CYLINDER: -0.25
OD_SPHERE: 0.00
OS_AXIS: 106
OS_SPHERE: 0.00
OD_AXIS: 054
OS_CYLINDER: -0.25

## 2023-05-31 ASSESSMENT — VISUAL ACUITY
OD_BCVA: 20/40
OS_BCVA: 20/40

## 2023-05-31 ASSESSMENT — LID EXAM ASSESSMENTS
OD_BLEPHARITIS: RLL RUL
OS_BLEPHARITIS: LLL LUL

## 2023-05-31 ASSESSMENT — CONFRONTATIONAL VISUAL FIELD TEST (CVF)
OD_FINDINGS: FULL
OS_FINDINGS: FULL

## 2023-05-31 ASSESSMENT — CORNEAL PTERYGIUM: OD_PTERYGIUM: TEMPORAL 1MM

## 2023-05-31 ASSESSMENT — TONOMETRY
OD_IOP_MMHG: 21
OS_IOP_MMHG: 17

## 2023-05-31 ASSESSMENT — SPHEQUIV_DERIVED
OS_SPHEQUIV: -0.125
OD_SPHEQUIV: -0.125

## 2023-09-26 ENCOUNTER — OFFICE (OUTPATIENT)
Dept: URBAN - METROPOLITAN AREA CLINIC 94 | Facility: CLINIC | Age: 73
Setting detail: OPHTHALMOLOGY
End: 2023-09-26
Payer: COMMERCIAL

## 2023-09-26 DIAGNOSIS — E11.3313: ICD-10-CM

## 2023-09-26 DIAGNOSIS — E11.3311: ICD-10-CM

## 2023-09-26 DIAGNOSIS — H35.343: ICD-10-CM

## 2023-09-26 PROCEDURE — 67210 TREATMENT OF RETINAL LESION: CPT | Performed by: OPHTHALMOLOGY

## 2023-09-26 PROCEDURE — 92134 CPTRZ OPH DX IMG PST SGM RTA: CPT | Performed by: OPHTHALMOLOGY

## 2023-09-26 ASSESSMENT — AXIALLENGTH_DERIVED
OS_AL: 23.0124
OD_AL: 22.9688

## 2023-09-26 ASSESSMENT — REFRACTION_AUTOREFRACTION
OD_AXIS: 054
OD_CYLINDER: -0.25
OS_CYLINDER: -0.25
OS_AXIS: 106
OS_SPHERE: 0.00
OD_SPHERE: 0.00

## 2023-09-26 ASSESSMENT — SPHEQUIV_DERIVED
OD_SPHEQUIV: -0.125
OS_SPHEQUIV: -0.125

## 2023-09-26 ASSESSMENT — TONOMETRY
OS_IOP_MMHG: 16
OD_IOP_MMHG: 20

## 2023-09-26 ASSESSMENT — KERATOMETRY
OS_K2POWER_DIOPTERS: 45.75
OS_AXISANGLE_DEGREES: 079
OD_AXISANGLE_DEGREES: 100
METHOD_AUTO_MANUAL: AUTO
OD_K2POWER_DIOPTERS: 45.75
OD_K1POWER_DIOPTERS: 45.00
OS_K1POWER_DIOPTERS: 44.75

## 2023-09-26 ASSESSMENT — CORNEAL PTERYGIUM: OD_PTERYGIUM: TEMPORAL 1MM

## 2023-09-26 ASSESSMENT — CONFRONTATIONAL VISUAL FIELD TEST (CVF)
OD_FINDINGS: FULL
OS_FINDINGS: FULL

## 2023-09-26 ASSESSMENT — LID EXAM ASSESSMENTS
OS_BLEPHARITIS: LLL LUL
OD_BLEPHARITIS: RLL RUL

## 2023-09-26 ASSESSMENT — VISUAL ACUITY
OD_BCVA: 20/30
OS_BCVA: 20/30-1

## 2023-10-17 ENCOUNTER — OFFICE (OUTPATIENT)
Dept: URBAN - METROPOLITAN AREA CLINIC 94 | Facility: CLINIC | Age: 73
Setting detail: OPHTHALMOLOGY
End: 2023-10-17
Payer: COMMERCIAL

## 2023-10-17 DIAGNOSIS — E11.3312: ICD-10-CM

## 2023-10-17 PROCEDURE — 67210 TREATMENT OF RETINAL LESION: CPT | Mod: 79,LT | Performed by: OPHTHALMOLOGY

## 2023-10-17 ASSESSMENT — SPHEQUIV_DERIVED
OS_SPHEQUIV: -0.125
OD_SPHEQUIV: -0.125

## 2023-10-17 ASSESSMENT — TONOMETRY
OD_IOP_MMHG: 18
OS_IOP_MMHG: 19

## 2023-10-17 ASSESSMENT — CONFRONTATIONAL VISUAL FIELD TEST (CVF)
OS_FINDINGS: FULL
OD_FINDINGS: FULL

## 2023-10-17 ASSESSMENT — LID EXAM ASSESSMENTS
OD_BLEPHARITIS: RLL RUL
OS_BLEPHARITIS: LLL LUL

## 2023-10-17 ASSESSMENT — KERATOMETRY
OD_K1POWER_DIOPTERS: 45.00
OS_K1POWER_DIOPTERS: 44.75
OD_K2POWER_DIOPTERS: 45.75
OD_AXISANGLE_DEGREES: 100
METHOD_AUTO_MANUAL: AUTO
OS_K2POWER_DIOPTERS: 45.75
OS_AXISANGLE_DEGREES: 079

## 2023-10-17 ASSESSMENT — VISUAL ACUITY
OD_BCVA: 20/30-1
OS_BCVA: 20/40

## 2023-10-17 ASSESSMENT — AXIALLENGTH_DERIVED
OD_AL: 22.9688
OS_AL: 23.0124

## 2023-10-17 ASSESSMENT — REFRACTION_AUTOREFRACTION
OD_CYLINDER: -0.25
OS_CYLINDER: -0.25
OD_AXIS: 054
OS_AXIS: 106
OS_SPHERE: 0.00
OD_SPHERE: 0.00

## 2023-10-17 ASSESSMENT — CORNEAL PTERYGIUM: OD_PTERYGIUM: TEMPORAL 1MM

## 2023-11-03 ENCOUNTER — OFFICE (OUTPATIENT)
Dept: URBAN - METROPOLITAN AREA CLINIC 94 | Facility: CLINIC | Age: 73
Setting detail: OPHTHALMOLOGY
End: 2023-11-03
Payer: COMMERCIAL

## 2023-11-03 DIAGNOSIS — H26.493: ICD-10-CM

## 2023-11-03 DIAGNOSIS — H40.033: ICD-10-CM

## 2023-11-03 DIAGNOSIS — H04.123: ICD-10-CM

## 2023-11-03 DIAGNOSIS — H26.492: ICD-10-CM

## 2023-11-03 DIAGNOSIS — H26.491: ICD-10-CM

## 2023-11-03 PROCEDURE — 99213 OFFICE O/P EST LOW 20 MIN: CPT | Mod: 24 | Performed by: OPHTHALMOLOGY

## 2023-11-03 PROCEDURE — 83861 MICROFLUID ANALY TEARS: CPT | Mod: LT | Performed by: OPHTHALMOLOGY

## 2023-11-03 PROCEDURE — 83861 MICROFLUID ANALY TEARS: CPT | Mod: RT | Performed by: OPHTHALMOLOGY

## 2023-11-03 ASSESSMENT — LID EXAM ASSESSMENTS
OD_BLEPHARITIS: RLL RUL
OS_BLEPHARITIS: LLL LUL

## 2023-11-03 ASSESSMENT — SPHEQUIV_DERIVED
OS_SPHEQUIV: -0.125
OD_SPHEQUIV: -0.125

## 2023-11-03 ASSESSMENT — CORNEAL PTERYGIUM: OD_PTERYGIUM: TEMPORAL 1MM

## 2023-11-03 ASSESSMENT — REFRACTION_AUTOREFRACTION
OD_CYLINDER: -0.25
OD_SPHERE: 0.00
OD_AXIS: 054
OS_AXIS: 106
OS_SPHERE: 0.00
OS_CYLINDER: -0.25

## 2023-11-03 ASSESSMENT — CONFRONTATIONAL VISUAL FIELD TEST (CVF)
OD_FINDINGS: FULL
OS_FINDINGS: FULL

## 2024-01-23 ENCOUNTER — OFFICE (OUTPATIENT)
Dept: URBAN - METROPOLITAN AREA CLINIC 94 | Facility: CLINIC | Age: 74
Setting detail: OPHTHALMOLOGY
End: 2024-01-23
Payer: COMMERCIAL

## 2024-01-23 DIAGNOSIS — H35.343: ICD-10-CM

## 2024-01-23 DIAGNOSIS — E11.3311: ICD-10-CM

## 2024-01-23 DIAGNOSIS — E11.3313: ICD-10-CM

## 2024-01-23 PROBLEM — E11.3312 DM TYPE 2; RIGHT MOD WITH ME, LEFT MOD WITH ME: Status: ACTIVE | Noted: 2024-01-23

## 2024-01-23 PROCEDURE — 92134 CPTRZ OPH DX IMG PST SGM RTA: CPT | Performed by: OPHTHALMOLOGY

## 2024-01-23 PROCEDURE — 92235 FLUORESCEIN ANGRPH MLTIFRAME: CPT | Performed by: OPHTHALMOLOGY

## 2024-01-23 PROCEDURE — 67210 TREATMENT OF RETINAL LESION: CPT | Mod: RT | Performed by: OPHTHALMOLOGY

## 2024-01-23 ASSESSMENT — LID EXAM ASSESSMENTS
OS_BLEPHARITIS: LLL LUL
OD_BLEPHARITIS: RLL RUL

## 2024-01-23 ASSESSMENT — SPHEQUIV_DERIVED
OS_SPHEQUIV: -0.125
OD_SPHEQUIV: -0.125

## 2024-01-23 ASSESSMENT — REFRACTION_AUTOREFRACTION
OD_SPHERE: 0.00
OS_AXIS: 106
OD_AXIS: 054
OD_CYLINDER: -0.25
OS_SPHERE: 0.00
OS_CYLINDER: -0.25

## 2024-01-23 ASSESSMENT — CORNEAL PTERYGIUM: OD_PTERYGIUM: TEMPORAL 1MM

## 2024-02-07 ENCOUNTER — OFFICE (OUTPATIENT)
Dept: URBAN - METROPOLITAN AREA CLINIC 94 | Facility: CLINIC | Age: 74
Setting detail: OPHTHALMOLOGY
End: 2024-02-07
Payer: MEDICAID

## 2024-02-07 DIAGNOSIS — E11.3312: ICD-10-CM

## 2024-02-07 PROCEDURE — 67210 TREATMENT OF RETINAL LESION: CPT | Mod: 79,LT | Performed by: OPHTHALMOLOGY

## 2024-02-07 ASSESSMENT — REFRACTION_AUTOREFRACTION
OD_SPHERE: 0.00
OS_CYLINDER: -0.25
OD_CYLINDER: -0.25
OS_AXIS: 106
OS_SPHERE: 0.00
OD_AXIS: 054

## 2024-02-07 ASSESSMENT — LID EXAM ASSESSMENTS
OD_BLEPHARITIS: RLL RUL
OS_BLEPHARITIS: LLL LUL

## 2024-02-07 ASSESSMENT — CONFRONTATIONAL VISUAL FIELD TEST (CVF)
OD_FINDINGS: FULL
OS_FINDINGS: FULL

## 2024-02-07 ASSESSMENT — CORNEAL PTERYGIUM: OD_PTERYGIUM: TEMPORAL 1MM

## 2024-02-07 ASSESSMENT — SPHEQUIV_DERIVED
OS_SPHEQUIV: -0.125
OD_SPHEQUIV: -0.125

## 2024-03-15 ENCOUNTER — OFFICE (OUTPATIENT)
Dept: URBAN - METROPOLITAN AREA CLINIC 94 | Facility: CLINIC | Age: 74
Setting detail: OPHTHALMOLOGY
End: 2024-03-15
Payer: MEDICAID

## 2024-03-15 ENCOUNTER — RX ONLY (RX ONLY)
Age: 74
End: 2024-03-15

## 2024-03-15 DIAGNOSIS — H40.033: ICD-10-CM

## 2024-03-15 DIAGNOSIS — H04.123: ICD-10-CM

## 2024-03-15 DIAGNOSIS — H26.491: ICD-10-CM

## 2024-03-15 PROCEDURE — 99213 OFFICE O/P EST LOW 20 MIN: CPT | Mod: 24,57 | Performed by: OPHTHALMOLOGY

## 2024-03-15 PROCEDURE — 92250 FUNDUS PHOTOGRAPHY W/I&R: CPT | Performed by: OPHTHALMOLOGY

## 2024-03-15 PROCEDURE — 66821 AFTER CATARACT LASER SURGERY: CPT | Mod: 79,RT | Performed by: OPHTHALMOLOGY

## 2024-03-15 ASSESSMENT — LID EXAM ASSESSMENTS
OD_BLEPHARITIS: RLL RUL
OS_BLEPHARITIS: LLL LUL

## 2024-03-15 ASSESSMENT — REFRACTION_CURRENTRX
OS_OVR_VA: 20/
OD_ADD: +3.25
OS_SPHERE: PLANO
OD_VPRISM_DIRECTION: BF
OD_SPHERE: PLANO
OD_OVR_VA: 20/
OS_ADD: +3.00
OS_VPRISM_DIRECTION: BF

## 2024-03-19 ENCOUNTER — EMERGENCY (EMERGENCY)
Facility: HOSPITAL | Age: 74
LOS: 1 days | Discharge: HOME | End: 2024-03-19
Attending: STUDENT IN AN ORGANIZED HEALTH CARE EDUCATION/TRAINING PROGRAM
Payer: MEDICAID

## 2024-03-19 VITALS
WEIGHT: 147.27 LBS | OXYGEN SATURATION: 98 % | TEMPERATURE: 98 F | SYSTOLIC BLOOD PRESSURE: 161 MMHG | RESPIRATION RATE: 17 BRPM | HEART RATE: 82 BPM | DIASTOLIC BLOOD PRESSURE: 80 MMHG

## 2024-03-19 VITALS
DIASTOLIC BLOOD PRESSURE: 76 MMHG | HEART RATE: 73 BPM | OXYGEN SATURATION: 98 % | TEMPERATURE: 98 F | SYSTOLIC BLOOD PRESSURE: 159 MMHG | RESPIRATION RATE: 18 BRPM

## 2024-03-19 LAB
ALBUMIN SERPL ELPH-MCNC: 4.5 G/DL — SIGNIFICANT CHANGE UP (ref 3.3–5.2)
ALBUMIN SERPL ELPH-MCNC: 4.9 G/DL — SIGNIFICANT CHANGE UP (ref 3.3–5.2)
ALP SERPL-CCNC: 61 U/L — SIGNIFICANT CHANGE UP (ref 40–120)
ALP SERPL-CCNC: 67 U/L — SIGNIFICANT CHANGE UP (ref 40–120)
ALT FLD-CCNC: 8 U/L — SIGNIFICANT CHANGE UP
ALT FLD-CCNC: 8 U/L — SIGNIFICANT CHANGE UP
ANION GAP SERPL CALC-SCNC: 14 MMOL/L — SIGNIFICANT CHANGE UP (ref 5–17)
ANION GAP SERPL CALC-SCNC: 16 MMOL/L — SIGNIFICANT CHANGE UP (ref 5–17)
APPEARANCE UR: CLEAR — SIGNIFICANT CHANGE UP
AST SERPL-CCNC: 16 U/L — SIGNIFICANT CHANGE UP
AST SERPL-CCNC: 17 U/L — SIGNIFICANT CHANGE UP
BASOPHILS # BLD AUTO: 0.03 K/UL — SIGNIFICANT CHANGE UP (ref 0–0.2)
BASOPHILS NFR BLD AUTO: 0.4 % — SIGNIFICANT CHANGE UP (ref 0–2)
BILIRUB SERPL-MCNC: 0.6 MG/DL — SIGNIFICANT CHANGE UP (ref 0.4–2)
BILIRUB SERPL-MCNC: 0.6 MG/DL — SIGNIFICANT CHANGE UP (ref 0.4–2)
BILIRUB UR-MCNC: NEGATIVE — SIGNIFICANT CHANGE UP
BUN SERPL-MCNC: 8.6 MG/DL — SIGNIFICANT CHANGE UP (ref 8–20)
BUN SERPL-MCNC: 9.3 MG/DL — SIGNIFICANT CHANGE UP (ref 8–20)
CALCIUM SERPL-MCNC: 8.6 MG/DL — SIGNIFICANT CHANGE UP (ref 8.4–10.5)
CALCIUM SERPL-MCNC: 9.4 MG/DL — SIGNIFICANT CHANGE UP (ref 8.4–10.5)
CHLORIDE SERPL-SCNC: 85 MMOL/L — LOW (ref 96–108)
CHLORIDE SERPL-SCNC: 90 MMOL/L — LOW (ref 96–108)
CO2 SERPL-SCNC: 23 MMOL/L — SIGNIFICANT CHANGE UP (ref 22–29)
CO2 SERPL-SCNC: 24 MMOL/L — SIGNIFICANT CHANGE UP (ref 22–29)
COLOR SPEC: YELLOW — SIGNIFICANT CHANGE UP
CREAT SERPL-MCNC: 0.42 MG/DL — LOW (ref 0.5–1.3)
CREAT SERPL-MCNC: 0.44 MG/DL — LOW (ref 0.5–1.3)
DIFF PNL FLD: ABNORMAL
EGFR: 103 ML/MIN/1.73M2 — SIGNIFICANT CHANGE UP
EGFR: 105 ML/MIN/1.73M2 — SIGNIFICANT CHANGE UP
EOSINOPHIL # BLD AUTO: 0.07 K/UL — SIGNIFICANT CHANGE UP (ref 0–0.5)
EOSINOPHIL NFR BLD AUTO: 0.9 % — SIGNIFICANT CHANGE UP (ref 0–6)
GLUCOSE SERPL-MCNC: 99 MG/DL — SIGNIFICANT CHANGE UP (ref 70–99)
GLUCOSE SERPL-MCNC: 99 MG/DL — SIGNIFICANT CHANGE UP (ref 70–99)
GLUCOSE UR QL: NEGATIVE MG/DL — SIGNIFICANT CHANGE UP
HCT VFR BLD CALC: 37.3 % — SIGNIFICANT CHANGE UP (ref 34.5–45)
HGB BLD-MCNC: 13.1 G/DL — SIGNIFICANT CHANGE UP (ref 11.5–15.5)
IMM GRANULOCYTES NFR BLD AUTO: 0.5 % — SIGNIFICANT CHANGE UP (ref 0–0.9)
KETONES UR-MCNC: ABNORMAL MG/DL
LEUKOCYTE ESTERASE UR-ACNC: NEGATIVE — SIGNIFICANT CHANGE UP
LIDOCAIN IGE QN: 18 U/L — LOW (ref 22–51)
LYMPHOCYTES # BLD AUTO: 1.65 K/UL — SIGNIFICANT CHANGE UP (ref 1–3.3)
LYMPHOCYTES # BLD AUTO: 20.2 % — SIGNIFICANT CHANGE UP (ref 13–44)
MCHC RBC-ENTMCNC: 30.3 PG — SIGNIFICANT CHANGE UP (ref 27–34)
MCHC RBC-ENTMCNC: 35.1 GM/DL — SIGNIFICANT CHANGE UP (ref 32–36)
MCV RBC AUTO: 86.3 FL — SIGNIFICANT CHANGE UP (ref 80–100)
MONOCYTES # BLD AUTO: 0.44 K/UL — SIGNIFICANT CHANGE UP (ref 0–0.9)
MONOCYTES NFR BLD AUTO: 5.4 % — SIGNIFICANT CHANGE UP (ref 2–14)
NEUTROPHILS # BLD AUTO: 5.94 K/UL — SIGNIFICANT CHANGE UP (ref 1.8–7.4)
NEUTROPHILS NFR BLD AUTO: 72.6 % — SIGNIFICANT CHANGE UP (ref 43–77)
NITRITE UR-MCNC: NEGATIVE — SIGNIFICANT CHANGE UP
PH UR: 7.5 — SIGNIFICANT CHANGE UP (ref 5–8)
PLATELET # BLD AUTO: 287 K/UL — SIGNIFICANT CHANGE UP (ref 150–400)
POTASSIUM SERPL-MCNC: 3.8 MMOL/L — SIGNIFICANT CHANGE UP (ref 3.5–5.3)
POTASSIUM SERPL-MCNC: 4.1 MMOL/L — SIGNIFICANT CHANGE UP (ref 3.5–5.3)
POTASSIUM SERPL-SCNC: 3.8 MMOL/L — SIGNIFICANT CHANGE UP (ref 3.5–5.3)
POTASSIUM SERPL-SCNC: 4.1 MMOL/L — SIGNIFICANT CHANGE UP (ref 3.5–5.3)
PROT SERPL-MCNC: 6.9 G/DL — SIGNIFICANT CHANGE UP (ref 6.6–8.7)
PROT SERPL-MCNC: 7.6 G/DL — SIGNIFICANT CHANGE UP (ref 6.6–8.7)
PROT UR-MCNC: 100 MG/DL
RBC # BLD: 4.32 M/UL — SIGNIFICANT CHANGE UP (ref 3.8–5.2)
RBC # FLD: 12.3 % — SIGNIFICANT CHANGE UP (ref 10.3–14.5)
SODIUM SERPL-SCNC: 124 MMOL/L — LOW (ref 135–145)
SODIUM SERPL-SCNC: 127 MMOL/L — LOW (ref 135–145)
SP GR SPEC: 1.01 — SIGNIFICANT CHANGE UP (ref 1–1.03)
UROBILINOGEN FLD QL: 0.2 MG/DL — SIGNIFICANT CHANGE UP (ref 0.2–1)
WBC # BLD: 8.17 K/UL — SIGNIFICANT CHANGE UP (ref 3.8–10.5)
WBC # FLD AUTO: 8.17 K/UL — SIGNIFICANT CHANGE UP (ref 3.8–10.5)

## 2024-03-19 PROCEDURE — 74177 CT ABD & PELVIS W/CONTRAST: CPT | Mod: 26,MC

## 2024-03-19 PROCEDURE — T1013: CPT

## 2024-03-19 PROCEDURE — 96365 THER/PROPH/DIAG IV INF INIT: CPT | Mod: XU

## 2024-03-19 PROCEDURE — 96375 TX/PRO/DX INJ NEW DRUG ADDON: CPT

## 2024-03-19 PROCEDURE — 74177 CT ABD & PELVIS W/CONTRAST: CPT | Mod: MC

## 2024-03-19 PROCEDURE — 80053 COMPREHEN METABOLIC PANEL: CPT

## 2024-03-19 PROCEDURE — 36415 COLL VENOUS BLD VENIPUNCTURE: CPT

## 2024-03-19 PROCEDURE — 85025 COMPLETE CBC W/AUTO DIFF WBC: CPT

## 2024-03-19 PROCEDURE — 81001 URINALYSIS AUTO W/SCOPE: CPT

## 2024-03-19 PROCEDURE — 99285 EMERGENCY DEPT VISIT HI MDM: CPT

## 2024-03-19 PROCEDURE — 83690 ASSAY OF LIPASE: CPT

## 2024-03-19 PROCEDURE — 99284 EMERGENCY DEPT VISIT MOD MDM: CPT | Mod: 25

## 2024-03-19 RX ORDER — SODIUM CHLORIDE 9 MG/ML
1000 INJECTION INTRAMUSCULAR; INTRAVENOUS; SUBCUTANEOUS ONCE
Refills: 0 | Status: COMPLETED | OUTPATIENT
Start: 2024-03-19 | End: 2024-03-19

## 2024-03-19 RX ORDER — ONDANSETRON 8 MG/1
4 TABLET, FILM COATED ORAL ONCE
Refills: 0 | Status: COMPLETED | OUTPATIENT
Start: 2024-03-19 | End: 2024-03-19

## 2024-03-19 RX ORDER — ACETAMINOPHEN 500 MG
1000 TABLET ORAL ONCE
Refills: 0 | Status: COMPLETED | OUTPATIENT
Start: 2024-03-19 | End: 2024-03-19

## 2024-03-19 RX ADMIN — SODIUM CHLORIDE 1000 MILLILITER(S): 9 INJECTION INTRAMUSCULAR; INTRAVENOUS; SUBCUTANEOUS at 19:35

## 2024-03-19 RX ADMIN — Medication 1000 MILLIGRAM(S): at 20:21

## 2024-03-19 RX ADMIN — Medication 400 MILLIGRAM(S): at 19:35

## 2024-03-19 RX ADMIN — ONDANSETRON 4 MILLIGRAM(S): 8 TABLET, FILM COATED ORAL at 19:35

## 2024-03-19 NOTE — ED PROVIDER NOTE - OBJECTIVE STATEMENT
Pt is a 72 yo F co F co diffuse abd pain, n/v. PMHx significant for htn, hypothyroid, DM. Pt states that for the past 3 d she has bad diffuse abd pain associated with n/v. no diarrhea. no fever/chills. no cough. no sob. no other complaints. Pt's pain persisted today so they came to the ER.

## 2024-03-19 NOTE — ED ADULT NURSE NOTE - OBJECTIVE STATEMENT
Pt is AxOx3 c/o of N/V, HTN for the past few days. Breathing is even and unlabored. Pt is aware of plan of care.

## 2024-03-19 NOTE — ED PROVIDER NOTE - IV ALTEPLASE ADMIN OUTSIDE HIDDEN
show
normal external genitalia/no hernia/no discharge/no mass/no tenderness/no ulcer/normal/no penile lesion/no palpable testicular mass/no scrotal mass

## 2024-03-19 NOTE — ED PROVIDER NOTE - NS ED ROS FT
No fever/chills   No photophobia/eye pain/changes in visio,   No ear pain/sore throat/dysphagia   No chest pain/palpitations  No SOB/cough/wheeze/stridor    abdominal pain, + N/V no Diarrhea  No dysuria/frequency/discharge   No neck/back pain,   No rash  No changes in neurological status/function.

## 2024-03-19 NOTE — ED PROVIDER NOTE - PROGRESS NOTE DETAILS
PT evaluated by intake physician. HPI/ROS/PE as noted above.     Lab results reviewed: Na 124, Cl 85; CBC wnl; lipase with no significant abnormaliites; UA shows trace blood, no infection. Patient hydrated. Will recheck Na/Cl. CT abd/pelvis pending. Reassess. Na and Cl improved. CT abd/pelvis negative for acute findings. Patient well appearing, NAD, non-toxic appearing. Patient reports improvement in symptoms. No further ED workup indicated at this time. Supportive care. Follow up with PCP. Return precautions discussed. Patient verbally demonstrated understanding of results and plan. Patient stable for discharge.

## 2024-03-19 NOTE — ED PROVIDER NOTE - ATTENDING APP SHARED VISIT CONTRIBUTION OF CARE
labs and imaging reviewed. Pt feeling better. no acute path. c/w viral syndrome. Pt reassured. given return and fup instructions.

## 2024-03-19 NOTE — ED ADULT TRIAGE NOTE - CHIEF COMPLAINT QUOTE
+N/V, sore throat, generalized abd pain,  subjective chills onset sunday, denies urinary symptoms/fever/sob.

## 2024-03-19 NOTE — ED PROVIDER NOTE - NSFOLLOWUPINSTRUCTIONS_ED_ALL_ED_FT
Stay hydrated and stick to a bland diet. Call to make an appointment to follow up with your primary care provider. Return to ER if developing shortness of breath, chest pain, worsening abdominal pain/vomiting or other worsening symptoms.     Manténgase hidratado y siga kit dieta blanda. Llame para programar kit fernanda de seguimiento con aguirre proveedor de atención primaria. Regrese a la cathy de emergencias si presenta dificultad para respirar, dolor en el pecho, empeoramiento del dolor/vómitos abdominales u otros síntomas que empeoran.

## 2024-03-19 NOTE — ED PROVIDER NOTE - PHYSICAL EXAMINATION
Constitutional - well-developed.   Head - NCAT. Airway patent.   Eyes - PERRL.   CV - RRR. no murmur. no edema.   Pulm - CTAB.   Abd - soft, mild diffuse ttp no rebound. no guarding.   Neuro - A&Ox3. strength 5/5 x4. sensation intact x4. normal gait.   Skin - No rash. .  MSK - normal ROM.

## 2024-03-19 NOTE — ED PROVIDER NOTE - CLINICAL SUMMARY MEDICAL DECISION MAKING FREE TEXT BOX
74 y/o female, with PMHx of HTN, HLD, DM and hypothyroidism, presents with diffuse abdominal pain a/w nausea/vomiting x 3 days, r/o acute intraabdominal pathology. Abdomen soft, +diffuse TTP. Will hydrate, check labs and obtain CT abd/pelvis. Reassess.

## 2024-03-19 NOTE — ED PROVIDER NOTE - PATIENT PORTAL LINK FT
You can access the FollowMyHealth Patient Portal offered by [facility] by registering at the following website: [email or web address]. By joining [facility]’s FollowMyHealth portal, you will also be able to view your health information using other applications (apps) compatible with our system.

## 2024-03-19 NOTE — ED ADULT NURSE REASSESSMENT NOTE - NS ED NURSE REASSESS COMMENT FT1
Report received from off-going RN at 19:30, VSS, pt c/o N/V. Respirations even and unlabored. IV established. Blood specimens sent to lab. Medications administered as ordered. Patient safety maintained.

## 2024-03-22 ENCOUNTER — ASC (OUTPATIENT)
Dept: URBAN - METROPOLITAN AREA SURGERY 8 | Facility: SURGERY | Age: 74
Setting detail: OPHTHALMOLOGY
End: 2024-03-22
Payer: MEDICAID

## 2024-03-22 DIAGNOSIS — H26.492: ICD-10-CM

## 2024-03-22 PROCEDURE — 66821 AFTER CATARACT LASER SURGERY: CPT | Mod: 79,LT | Performed by: OPHTHALMOLOGY

## 2024-03-22 ASSESSMENT — REFRACTION_CURRENTRX
OS_OVR_VA: 20/
OD_SPHERE: PLANO
OD_ADD: +3.25
OD_OVR_VA: 20/
OS_SPHERE: PLANO
OS_VPRISM_DIRECTION: BF
OS_ADD: +3.00
OD_VPRISM_DIRECTION: BF

## 2024-06-04 ENCOUNTER — OFFICE (OUTPATIENT)
Dept: URBAN - METROPOLITAN AREA CLINIC 112 | Facility: CLINIC | Age: 74
Setting detail: OPHTHALMOLOGY
End: 2024-06-04
Payer: MEDICAID

## 2024-06-04 DIAGNOSIS — H00.15: ICD-10-CM

## 2024-06-04 DIAGNOSIS — H40.033: ICD-10-CM

## 2024-06-04 DIAGNOSIS — H16.223: ICD-10-CM

## 2024-06-04 PROCEDURE — 99212 OFFICE O/P EST SF 10 MIN: CPT | Mod: 24 | Performed by: OPHTHALMOLOGY

## 2024-06-04 ASSESSMENT — LID EXAM ASSESSMENTS
OS_BLEPHARITIS: LLL LUL
OD_BLEPHARITIS: RLL RUL

## 2024-06-04 ASSESSMENT — CONFRONTATIONAL VISUAL FIELD TEST (CVF)
OS_FINDINGS: FULL
OD_FINDINGS: FULL

## 2024-06-22 ENCOUNTER — OFFICE (OUTPATIENT)
Dept: URBAN - METROPOLITAN AREA CLINIC 112 | Facility: CLINIC | Age: 74
Setting detail: OPHTHALMOLOGY
End: 2024-06-22
Payer: MEDICAID

## 2024-06-22 DIAGNOSIS — H01.004: ICD-10-CM

## 2024-06-22 DIAGNOSIS — H01.001: ICD-10-CM

## 2024-06-22 DIAGNOSIS — H01.005: ICD-10-CM

## 2024-06-22 DIAGNOSIS — H16.221: ICD-10-CM

## 2024-06-22 DIAGNOSIS — H16.223: ICD-10-CM

## 2024-06-22 DIAGNOSIS — H01.002: ICD-10-CM

## 2024-06-22 DIAGNOSIS — H16.222: ICD-10-CM

## 2024-06-22 DIAGNOSIS — H02.825: ICD-10-CM

## 2024-06-22 DIAGNOSIS — H00.15: ICD-10-CM

## 2024-06-22 PROBLEM — D48.5 LESION ; LEFT EYE: Status: ACTIVE | Noted: 2024-06-22

## 2024-06-22 PROCEDURE — 92285 EXTERNAL OCULAR PHOTOGRAPHY: CPT | Performed by: OPHTHALMOLOGY

## 2024-06-22 PROCEDURE — 99213 OFFICE O/P EST LOW 20 MIN: CPT | Performed by: OPHTHALMOLOGY

## 2024-06-22 ASSESSMENT — CONFRONTATIONAL VISUAL FIELD TEST (CVF)
OS_FINDINGS: FULL
OD_FINDINGS: FULL

## 2024-06-22 ASSESSMENT — LID EXAM ASSESSMENTS
OS_BLEPHARITIS: LLL LUL
OD_BLEPHARITIS: RLL RUL

## 2024-07-01 ENCOUNTER — APPOINTMENT (OUTPATIENT)
Dept: ORTHOPEDIC SURGERY | Facility: CLINIC | Age: 74
End: 2024-07-01

## 2024-07-13 ENCOUNTER — OFFICE (OUTPATIENT)
Dept: URBAN - METROPOLITAN AREA CLINIC 112 | Facility: CLINIC | Age: 74
Setting detail: OPHTHALMOLOGY
End: 2024-07-13
Payer: MEDICAID

## 2024-07-13 DIAGNOSIS — H00.15: ICD-10-CM

## 2024-07-13 DIAGNOSIS — H02.825: ICD-10-CM

## 2024-07-13 PROBLEM — D48.5 LESION ; LEFT EYE: Status: ACTIVE | Noted: 2024-07-13

## 2024-07-13 PROCEDURE — 67800 REMOVE EYELID LESION: CPT | Mod: E2 | Performed by: OPHTHALMOLOGY

## 2024-07-13 PROCEDURE — 92012 INTRM OPH EXAM EST PATIENT: CPT | Mod: 25 | Performed by: OPHTHALMOLOGY

## 2024-07-13 ASSESSMENT — LID EXAM ASSESSMENTS
OD_BLEPHARITIS: RLL RUL
OS_BLEPHARITIS: LLL LUL

## 2024-07-24 DIAGNOSIS — M25.561 PAIN IN RIGHT KNEE: ICD-10-CM

## 2024-07-25 ENCOUNTER — APPOINTMENT (OUTPATIENT)
Dept: ORTHOPEDIC SURGERY | Facility: CLINIC | Age: 74
End: 2024-07-25
Payer: MEDICAID

## 2024-07-25 VITALS — BODY MASS INDEX: 30.21 KG/M2 | HEIGHT: 61 IN | WEIGHT: 160 LBS

## 2024-07-25 DIAGNOSIS — M17.11 UNILATERAL PRIMARY OSTEOARTHRITIS, RIGHT KNEE: ICD-10-CM

## 2024-07-25 PROCEDURE — 73564 X-RAY EXAM KNEE 4 OR MORE: CPT | Mod: RT

## 2024-07-25 PROCEDURE — 99204 OFFICE O/P NEW MOD 45 MIN: CPT | Mod: 25

## 2024-07-25 PROCEDURE — 20610 DRAIN/INJ JOINT/BURSA W/O US: CPT | Mod: RT

## 2024-07-25 RX ORDER — LEVOTHYROXINE SODIUM 0.17 MG/1
TABLET ORAL
Refills: 0 | Status: ACTIVE | COMMUNITY

## 2024-07-25 RX ORDER — METFORMIN HYDROCHLORIDE 625 MG/1
TABLET ORAL
Refills: 0 | Status: ACTIVE | COMMUNITY

## 2024-07-25 RX ORDER — LOSARTAN POTASSIUM 100 MG/1
TABLET, FILM COATED ORAL
Refills: 0 | Status: ACTIVE | COMMUNITY

## 2024-07-25 RX ORDER — DULAGLUTIDE 4.5 MG/.5ML
INJECTION, SOLUTION SUBCUTANEOUS
Refills: 0 | Status: ACTIVE | COMMUNITY

## 2024-07-25 RX ORDER — MELOXICAM 15 MG/1
15 TABLET ORAL
Qty: 30 | Refills: 0 | Status: ACTIVE | COMMUNITY
Start: 2024-07-25 | End: 1900-01-01

## 2024-07-25 NOTE — PHYSICAL EXAM
[de-identified] :  GENERAL APPEARANCE: Well nourished and hydrated, pleasant, alert, and oriented x 3. Appears their stated age.     HEENT: Normocephalic, extraocular eye motion intact. Nasal septum midline. Oral cavity clear. External auditory canal clear.   RESPIRATORY: Breath sounds clear and audible in all lobes. No wheezing, No accessory muscle use.   CARDIOVASCULAR: No apparent abnormalities. No lower leg edema. No varicosities. Pedal pulses are palpable.   NEUROLOGIC: Sensation is normal, no muscle weakness in the upper or lower extremities.   DERMATOLOGIC: No apparent skin lesions, moist, warm, no rash.   SPINE: Cervical spine appears normal and moves freely; thoracic spine appears normal and moves freely; lumbosacral spine appears normal and moves freely, normal, nontender.   MUSCULOSKELETAL: Hands, wrists, and elbows are normal and move freely, shoulders are normal and move freely.   Psychiatric: Oriented to person, place, and time, insight and judgement were intact and the affect was normal. [de-identified] :  Right knee exam shows mild effusion, ROM is 5-120 degrees, no instability, no pain with Sandy, medial joint line tenderness.   5/5 motor strength in bilateral lower extremities. Sensory: Intact in bilateral lower extremities. DTRs: Biceps, brachioradialis, triceps, patellar, ankle and plantar 2+ and symmetric bilaterally. Pulses: dorsalis pedis, posterior tibial, femoral, popliteal, and radial 2+ and symmetric bilaterally. [de-identified] : 4 views of the right knee obtained the office today show no acute fracture or dislocation.  There is medial joint space narrowing patellofemoral arthritis osteophyte formation consistent with Kellgren-Leroy grade 2 3 changes

## 2024-07-25 NOTE — HISTORY OF PRESENT ILLNESS
[de-identified] : 07/25/2024: RUCHI GALVIN is a 74 year old female presenting to the office with her daughter/ for an initial evaluation of right knee pain. She reports that the pain began 3 months ago. The patient cannot attribute their pain to any injury, fall, or trauma. She endorses pain in the anterior aspect of her knee. Pain is exacerbated when walking for prolonged periods of time and she requires a walking cane. She denies numbness or tingling. She has not used OTC medication for her pain. She denies attending physical therapy and seeing a previous provider for her symptoms. She is currently ambulating with a cane.  No other complaints at this time. No constitutional symptoms

## 2024-07-25 NOTE — DISCUSSION/SUMMARY
[Medication Risks Reviewed] : Medication risks reviewed [Surgical risks reviewed] : Surgical risks reviewed [de-identified] : Patient is a 74-year-old female with moderate right knee osteoarthritis presenting today for initial evaluation.  She is not a try conservative treatment I think that is warranted at this time.  I gave her injection cortisone the right knee which she tolerated well.  We discussed low impact activity and exercise.  I recommended physical therapy.  Given prescription meloxicam 15 mg daily as needed for pain.  I will see her back in 6 weeks for repeat evaluation.  All questions were asked and answered

## 2024-07-25 NOTE — PROCEDURE
[de-identified] : I injected the right knee.  I discussed at length with the patient the planned steroid and lidocaine injection. The risks, benefits, convalescence and alternatives were reviewed. The possible side effects discussed included but were not limited to: pain, swelling, heat, bleeding, and redness. Symptoms are generally mild but if they are extensive then contact the office. Giving pain relievers by mouth such as NSAIDs or Tylenol can generally treat the reactions to steroid and lidocaine. Rare cases of infection have been noted. Rash, hives and itching may occur post injection. If you have muscle pain or cramps, flushing and or swelling of the face, rapid heart beat, nausea, dizziness, fever, chills, headache, difficulty breathing, swelling in the arms or legs, or have a prickly feeling of your skin, contact a health care provider immediately. Following this discussion, the knee was prepped with Alcohol and under sterile conditions the 80 mg Depo-Medrol and 6 cc Lidocaine injection was performed with a 20 gauge needle through a superolateral injection site. The needle was introduced into the joint, aspiration was performed to ensure intra-articular placement and the medication was injected. Upon withdrawal of the needle the site was cleaned with alcohol and a band aid applied. The patient tolerated the injection well and there were no adverse effects. Post injection instructions included no strenuous activity for 24 hours, cryotherapy and if there are any adverse effects to contact the office.

## 2024-07-25 NOTE — ADDENDUM
[FreeTextEntry1] : I, Jarad London, acted solely as a scribe for Dr. Lester Thomas on this date 07/25/2024.   All medical record entries made by the Scribe were at my, Dr. Lester Thomas, direction and personally dictated by me on 07/25/2024. I have reviewed the chart and agree that the record accurately reflects my personal performance of the history, physical exam, assessment and plan. I have also personally directed, reviewed, and agreed with the chart.

## 2024-09-10 ENCOUNTER — APPOINTMENT (OUTPATIENT)
Dept: ORTHOPEDIC SURGERY | Facility: CLINIC | Age: 74
End: 2024-09-10

## 2024-10-08 ENCOUNTER — OFFICE (OUTPATIENT)
Dept: URBAN - METROPOLITAN AREA CLINIC 112 | Facility: CLINIC | Age: 74
Setting detail: OPHTHALMOLOGY
End: 2024-10-08
Payer: MEDICAID

## 2024-10-08 DIAGNOSIS — H01.001: ICD-10-CM

## 2024-10-08 DIAGNOSIS — H00.15: ICD-10-CM

## 2024-10-08 DIAGNOSIS — H01.005: ICD-10-CM

## 2024-10-08 DIAGNOSIS — H01.002: ICD-10-CM

## 2024-10-08 DIAGNOSIS — H16.221: ICD-10-CM

## 2024-10-08 DIAGNOSIS — H11.041: ICD-10-CM

## 2024-10-08 DIAGNOSIS — H01.004: ICD-10-CM

## 2024-10-08 DIAGNOSIS — H02.825: ICD-10-CM

## 2024-10-08 DIAGNOSIS — H16.223: ICD-10-CM

## 2024-10-08 DIAGNOSIS — H16.222: ICD-10-CM

## 2024-10-08 PROCEDURE — 92012 INTRM OPH EXAM EST PATIENT: CPT | Performed by: OPHTHALMOLOGY

## 2024-10-08 ASSESSMENT — KERATOMETRY
OD_K2POWER_DIOPTERS: 46.00
OD_AXISANGLE_DEGREES: 112
OS_K1POWER_DIOPTERS: 45.00
METHOD_AUTO_MANUAL: AUTO
OS_K2POWER_DIOPTERS: 45.75
OD_K1POWER_DIOPTERS: 45.25
OS_AXISANGLE_DEGREES: 072

## 2024-10-08 ASSESSMENT — LID EXAM ASSESSMENTS
OS_BLEPHARITIS: LLL LUL
OD_BLEPHARITIS: RLL RUL

## 2024-10-08 ASSESSMENT — REFRACTION_AUTOREFRACTION
OD_AXIS: 055
OS_SPHERE: -0.25
OD_SPHERE: -0.50
OS_AXIS: 117
OD_CYLINDER: -0.50
OS_CYLINDER: -0.25

## 2024-10-08 ASSESSMENT — VISUAL ACUITY
OD_BCVA: 20/40
OS_BCVA: 20/50+1

## 2024-10-08 ASSESSMENT — REFRACTION_CURRENTRX
OS_ADD: +3.00
OD_OVR_VA: 20/
OS_VPRISM_DIRECTION: BF
OD_VPRISM_DIRECTION: BF
OD_SPHERE: PLANO
OS_OVR_VA: 20/
OD_ADD: +3.25
OS_SPHERE: PLANO

## 2024-10-08 ASSESSMENT — SUPERFICIAL PUNCTATE KERATITIS (SPK)
OS_SPK: T
OD_SPK: T

## 2024-10-08 ASSESSMENT — CONFRONTATIONAL VISUAL FIELD TEST (CVF)
OS_FINDINGS: FULL
OD_FINDINGS: FULL

## 2024-10-08 ASSESSMENT — CORNEAL PTERYGIUM: OD_PTERYGIUM: TEMPORAL 1MM

## 2024-10-08 ASSESSMENT — TONOMETRY
OS_IOP_MMHG: 18
OD_IOP_MMHG: 20

## 2024-12-10 ENCOUNTER — OFFICE (OUTPATIENT)
Dept: URBAN - METROPOLITAN AREA CLINIC 112 | Facility: CLINIC | Age: 74
Setting detail: OPHTHALMOLOGY
End: 2024-12-10
Payer: MEDICAID

## 2024-12-10 DIAGNOSIS — H02.825: ICD-10-CM

## 2024-12-10 DIAGNOSIS — H16.223: ICD-10-CM

## 2024-12-10 PROBLEM — H01.001 BLEPHARITIS; RIGHT UPPER LID, RIGHT LOWER LID, LEFT UPPER LID, LEFT LOWER LID: Status: ACTIVE | Noted: 2024-12-10

## 2024-12-10 PROBLEM — H01.005 BLEPHARITIS; RIGHT UPPER LID, RIGHT LOWER LID, LEFT UPPER LID, LEFT LOWER LID: Status: ACTIVE | Noted: 2024-12-10

## 2024-12-10 PROBLEM — H01.004 BLEPHARITIS; RIGHT UPPER LID, RIGHT LOWER LID, LEFT UPPER LID, LEFT LOWER LID: Status: ACTIVE | Noted: 2024-12-10

## 2024-12-10 PROBLEM — H01.002 BLEPHARITIS; RIGHT UPPER LID, RIGHT LOWER LID, LEFT UPPER LID, LEFT LOWER LID: Status: ACTIVE | Noted: 2024-12-10

## 2024-12-10 PROCEDURE — 92012 INTRM OPH EXAM EST PATIENT: CPT | Performed by: OPHTHALMOLOGY

## 2024-12-10 PROCEDURE — 83861 MICROFLUID ANALY TEARS: CPT | Performed by: OPHTHALMOLOGY

## 2024-12-10 PROCEDURE — 92285 EXTERNAL OCULAR PHOTOGRAPHY: CPT | Performed by: OPHTHALMOLOGY

## 2024-12-10 ASSESSMENT — CONFRONTATIONAL VISUAL FIELD TEST (CVF)
OD_FINDINGS: FULL
OS_FINDINGS: FULL

## 2024-12-10 ASSESSMENT — LID EXAM ASSESSMENTS
OS_BLEPHARITIS: LLL LUL
OD_BLEPHARITIS: RLL RUL

## 2024-12-10 ASSESSMENT — TONOMETRY
OS_IOP_MMHG: 17
OD_IOP_MMHG: 21

## 2024-12-10 ASSESSMENT — REFRACTION_AUTOREFRACTION
OD_SPHERE: -0.50
OS_SPHERE: -0.25
OD_AXIS: 055
OD_CYLINDER: -0.50
OS_CYLINDER: -0.25
OS_AXIS: 117

## 2024-12-10 ASSESSMENT — KERATOMETRY
OD_AXISANGLE_DEGREES: 112
OS_AXISANGLE_DEGREES: 072
METHOD_AUTO_MANUAL: AUTO
OD_K1POWER_DIOPTERS: 45.25
OD_K2POWER_DIOPTERS: 46.00
OS_K2POWER_DIOPTERS: 45.75
OS_K1POWER_DIOPTERS: 45.00

## 2024-12-10 ASSESSMENT — SUPERFICIAL PUNCTATE KERATITIS (SPK)
OS_SPK: T
OD_SPK: T

## 2024-12-10 ASSESSMENT — CORNEAL PTERYGIUM: OD_PTERYGIUM: TEMPORAL 1MM

## 2024-12-10 ASSESSMENT — REFRACTION_CURRENTRX
OS_SPHERE: PLANO
OD_SPHERE: PLANO
OS_OVR_VA: 20/
OD_ADD: +3.25
OS_ADD: +3.00
OD_OVR_VA: 20/
OD_VPRISM_DIRECTION: BF
OS_VPRISM_DIRECTION: BF

## 2024-12-10 ASSESSMENT — VISUAL ACUITY
OS_BCVA: 20/30-1
OD_BCVA: 20/30-2

## 2025-02-25 ENCOUNTER — OFFICE (OUTPATIENT)
Dept: URBAN - METROPOLITAN AREA CLINIC 112 | Facility: CLINIC | Age: 75
Setting detail: OPHTHALMOLOGY
End: 2025-02-25
Payer: MEDICAID

## 2025-02-25 DIAGNOSIS — H02.825: ICD-10-CM

## 2025-02-25 DIAGNOSIS — H16.223: ICD-10-CM

## 2025-02-25 PROCEDURE — 92012 INTRM OPH EXAM EST PATIENT: CPT | Performed by: OPHTHALMOLOGY

## 2025-02-25 PROCEDURE — 92285 EXTERNAL OCULAR PHOTOGRAPHY: CPT | Performed by: OPHTHALMOLOGY

## 2025-02-25 ASSESSMENT — LID EXAM ASSESSMENTS
OD_BLEPHARITIS: RLL RUL
OS_BLEPHARITIS: LLL LUL

## 2025-02-25 ASSESSMENT — VISUAL ACUITY
OS_BCVA: 20/30-1
OD_BCVA: 20/30-2

## 2025-02-25 ASSESSMENT — REFRACTION_AUTOREFRACTION
OS_SPHERE: -0.25
OD_SPHERE: -0.50
OD_CYLINDER: -0.50
OS_AXIS: 117
OS_CYLINDER: -0.25
OD_AXIS: 055

## 2025-02-25 ASSESSMENT — CONFRONTATIONAL VISUAL FIELD TEST (CVF)
OD_FINDINGS: FULL
OS_FINDINGS: FULL

## 2025-02-25 ASSESSMENT — TONOMETRY: OD_IOP_MMHG: 20

## 2025-02-25 ASSESSMENT — REFRACTION_CURRENTRX
OS_ADD: +3.00
OS_VPRISM_DIRECTION: BF
OS_OVR_VA: 20/
OD_OVR_VA: 20/
OD_ADD: +3.25
OD_SPHERE: PLANO
OD_VPRISM_DIRECTION: BF
OS_SPHERE: PLANO

## 2025-02-25 ASSESSMENT — KERATOMETRY
OD_AXISANGLE_DEGREES: 112
OD_K2POWER_DIOPTERS: 46.00
OS_K2POWER_DIOPTERS: 45.75
OD_K1POWER_DIOPTERS: 45.25
METHOD_AUTO_MANUAL: AUTO
OS_AXISANGLE_DEGREES: 072
OS_K1POWER_DIOPTERS: 45.00

## 2025-02-25 ASSESSMENT — SUPERFICIAL PUNCTATE KERATITIS (SPK)
OS_SPK: T
OD_SPK: T

## 2025-02-25 ASSESSMENT — CORNEAL PTERYGIUM: OD_PTERYGIUM: TEMPORAL 1MM

## 2025-03-03 ENCOUNTER — APPOINTMENT (OUTPATIENT)
Dept: RHEUMATOLOGY | Facility: CLINIC | Age: 75
End: 2025-03-03

## 2025-03-03 VITALS
OXYGEN SATURATION: 98 % | DIASTOLIC BLOOD PRESSURE: 74 MMHG | SYSTOLIC BLOOD PRESSURE: 126 MMHG | TEMPERATURE: 97.6 F | BODY MASS INDEX: 29.27 KG/M2 | HEIGHT: 61 IN | WEIGHT: 155 LBS | HEART RATE: 77 BPM

## 2025-03-03 DIAGNOSIS — L85.3 XEROSIS CUTIS: ICD-10-CM

## 2025-03-03 DIAGNOSIS — Z63.4 DISAPPEARANCE AND DEATH OF FAMILY MEMBER: ICD-10-CM

## 2025-03-03 DIAGNOSIS — M19.90 UNSPECIFIED OSTEOARTHRITIS, UNSPECIFIED SITE: ICD-10-CM

## 2025-03-03 DIAGNOSIS — R76.8 OTHER SPECIFIED ABNORMAL IMMUNOLOGICAL FINDINGS IN SERUM: ICD-10-CM

## 2025-03-03 DIAGNOSIS — M54.41 LUMBAGO WITH SCIATICA, RIGHT SIDE: ICD-10-CM

## 2025-03-03 DIAGNOSIS — H04.123 DRY EYE SYNDROME OF BILATERAL LACRIMAL GLANDS: ICD-10-CM

## 2025-03-03 DIAGNOSIS — G89.29 LUMBAGO WITH SCIATICA, RIGHT SIDE: ICD-10-CM

## 2025-03-03 DIAGNOSIS — M79.7 FIBROMYALGIA: ICD-10-CM

## 2025-03-03 DIAGNOSIS — Z60.2 PROBLEMS RELATED TO LIVING ALONE: ICD-10-CM

## 2025-03-03 DIAGNOSIS — M15.9 POLYOSTEOARTHRITIS, UNSPECIFIED: ICD-10-CM

## 2025-03-03 DIAGNOSIS — M25.50 PAIN IN UNSPECIFIED JOINT: ICD-10-CM

## 2025-03-03 DIAGNOSIS — R68.2 DRY MOUTH, UNSPECIFIED: ICD-10-CM

## 2025-03-03 DIAGNOSIS — Z86.39 PERSONAL HISTORY OF OTHER ENDOCRINE, NUTRITIONAL AND METABOLIC DISEASE: ICD-10-CM

## 2025-03-03 PROCEDURE — 99205 OFFICE O/P NEW HI 60 MIN: CPT

## 2025-03-03 SDOH — SOCIAL STABILITY - SOCIAL INSECURITY: PROBLEMS RELATED TO LIVING ALONE: Z60.2

## 2025-03-03 SDOH — SOCIAL STABILITY - SOCIAL INSECURITY: DISSAPEARANCE AND DEATH OF FAMILY MEMBER: Z63.4

## 2025-03-05 RX ORDER — MULTIVITAMIN
TABLET ORAL
Refills: 0 | Status: ACTIVE | COMMUNITY

## 2025-03-05 RX ORDER — CYCLOBENZAPRINE HYDROCHLORIDE 10 MG/1
10 TABLET, FILM COATED ORAL
Qty: 30 | Refills: 1 | Status: ACTIVE | COMMUNITY
Start: 2025-03-05 | End: 1900-01-01

## 2025-03-05 RX ORDER — ASPIRIN ENTERIC COATED TABLETS 81 MG 81 MG/1
81 TABLET, DELAYED RELEASE ORAL
Qty: 30 | Refills: 3 | Status: ACTIVE | COMMUNITY
Start: 2025-03-05

## 2025-03-05 RX ORDER — ATORVASTATIN CALCIUM 20 MG/1
20 TABLET, FILM COATED ORAL
Refills: 0 | Status: ACTIVE | COMMUNITY

## 2025-03-05 RX ORDER — INSULIN GLARGINE-YFGN 100 [IU]/ML
100 INJECTION, SOLUTION SUBCUTANEOUS
Refills: 0 | Status: ACTIVE | COMMUNITY

## 2025-03-05 RX ORDER — ESOMEPRAZOLE MAGNESIUM 5 MG/1
GRANULE, DELAYED RELEASE ORAL
Refills: 0 | Status: ACTIVE | COMMUNITY

## 2025-03-05 RX ORDER — MELOXICAM 15 MG/1
15 TABLET ORAL
Qty: 30 | Refills: 1 | Status: ACTIVE | COMMUNITY
Start: 2025-03-05 | End: 1900-01-01

## 2025-03-25 ENCOUNTER — RX ONLY (RX ONLY)
Age: 75
End: 2025-03-25

## 2025-03-25 ENCOUNTER — OFFICE (OUTPATIENT)
Dept: URBAN - METROPOLITAN AREA CLINIC 112 | Facility: CLINIC | Age: 75
Setting detail: OPHTHALMOLOGY
End: 2025-03-25
Payer: MEDICAID

## 2025-03-25 DIAGNOSIS — H02.825: ICD-10-CM

## 2025-03-25 DIAGNOSIS — H16.223: ICD-10-CM

## 2025-03-25 DIAGNOSIS — H00.15: ICD-10-CM

## 2025-03-25 PROCEDURE — 92012 INTRM OPH EXAM EST PATIENT: CPT | Mod: 25 | Performed by: OPHTHALMOLOGY

## 2025-03-25 PROCEDURE — 67810 INCAL BX EYELID SKN LID MRGN: CPT | Mod: E2 | Performed by: OPHTHALMOLOGY

## 2025-03-25 ASSESSMENT — SUPERFICIAL PUNCTATE KERATITIS (SPK)
OD_SPK: T
OS_SPK: T

## 2025-03-25 ASSESSMENT — REFRACTION_CURRENTRX
OD_SPHERE: PLANO
OS_VPRISM_DIRECTION: BF
OS_SPHERE: PLANO
OD_VPRISM_DIRECTION: BF
OS_ADD: +3.00
OD_OVR_VA: 20/
OS_OVR_VA: 20/
OD_ADD: +3.25

## 2025-03-25 ASSESSMENT — VISUAL ACUITY
OD_BCVA: 20/40-1
OS_BCVA: 20/30-2

## 2025-03-25 ASSESSMENT — KERATOMETRY
OD_K2POWER_DIOPTERS: 46.00
METHOD_AUTO_MANUAL: AUTO
OD_K1POWER_DIOPTERS: 45.25
OS_K1POWER_DIOPTERS: 45.00
OS_AXISANGLE_DEGREES: 072
OS_K2POWER_DIOPTERS: 45.75
OD_AXISANGLE_DEGREES: 112

## 2025-03-25 ASSESSMENT — LID EXAM ASSESSMENTS
OD_BLEPHARITIS: RLL RUL
OS_BLEPHARITIS: LLL LUL

## 2025-03-25 ASSESSMENT — REFRACTION_AUTOREFRACTION
OD_CYLINDER: -0.50
OS_AXIS: 117
OS_SPHERE: -0.25
OD_SPHERE: -0.50
OS_CYLINDER: -0.25
OD_AXIS: 055

## 2025-03-25 ASSESSMENT — CONFRONTATIONAL VISUAL FIELD TEST (CVF)
OD_FINDINGS: FULL
OS_FINDINGS: FULL

## 2025-03-25 ASSESSMENT — CORNEAL PTERYGIUM: OD_PTERYGIUM: TEMPORAL 1MM

## 2025-04-22 ENCOUNTER — RX ONLY (RX ONLY)
Age: 75
End: 2025-04-22

## 2025-04-22 ENCOUNTER — OFFICE (OUTPATIENT)
Dept: URBAN - METROPOLITAN AREA CLINIC 112 | Facility: CLINIC | Age: 75
Setting detail: OPHTHALMOLOGY
End: 2025-04-22
Payer: MEDICAID

## 2025-04-22 DIAGNOSIS — C44.1292: ICD-10-CM

## 2025-04-22 DIAGNOSIS — H02.825: ICD-10-CM

## 2025-04-22 PROCEDURE — 99214 OFFICE O/P EST MOD 30 MIN: CPT | Performed by: OPHTHALMOLOGY

## 2025-04-22 ASSESSMENT — TONOMETRY
OD_IOP_MMHG: 19
OS_IOP_MMHG: 20

## 2025-04-22 ASSESSMENT — LID EXAM ASSESSMENTS
OD_BLEPHARITIS: RLL RUL
OS_BLEPHARITIS: LLL LUL

## 2025-04-22 ASSESSMENT — CONFRONTATIONAL VISUAL FIELD TEST (CVF)
OD_FINDINGS: FULL
OS_FINDINGS: FULL

## 2025-04-22 ASSESSMENT — CORNEAL PTERYGIUM: OD_PTERYGIUM: TEMPORAL 1MM

## 2025-04-22 ASSESSMENT — SUPERFICIAL PUNCTATE KERATITIS (SPK)
OD_SPK: T
OS_SPK: T

## 2025-04-24 PROBLEM — H01.002 BLEPHARITIS; RIGHT UPPER LID, RIGHT LOWER LID, LEFT UPPER LID, LEFT LOWER LID: Status: ACTIVE | Noted: 2025-04-22

## 2025-04-24 PROBLEM — H01.004 BLEPHARITIS; RIGHT UPPER LID, RIGHT LOWER LID, LEFT UPPER LID, LEFT LOWER LID: Status: ACTIVE | Noted: 2025-04-22

## 2025-04-24 PROBLEM — H01.005 BLEPHARITIS; RIGHT UPPER LID, RIGHT LOWER LID, LEFT UPPER LID, LEFT LOWER LID: Status: ACTIVE | Noted: 2025-04-22

## 2025-04-24 PROBLEM — H01.001 BLEPHARITIS; RIGHT UPPER LID, RIGHT LOWER LID, LEFT UPPER LID, LEFT LOWER LID: Status: ACTIVE | Noted: 2025-04-22

## 2025-04-24 ASSESSMENT — REFRACTION_CURRENTRX
OS_SPHERE: PLANO
OD_VPRISM_DIRECTION: BF
OS_VPRISM_DIRECTION: BF
OS_OVR_VA: 20/
OS_ADD: +3.00
OD_ADD: +3.25
OD_OVR_VA: 20/
OD_SPHERE: PLANO

## 2025-04-24 ASSESSMENT — KERATOMETRY
OD_AXISANGLE_DEGREES: 112
OD_K2POWER_DIOPTERS: 46.00
OS_AXISANGLE_DEGREES: 072
OS_K2POWER_DIOPTERS: 45.75
OD_K1POWER_DIOPTERS: 45.25
METHOD_AUTO_MANUAL: AUTO
OS_K1POWER_DIOPTERS: 45.00

## 2025-04-24 ASSESSMENT — REFRACTION_AUTOREFRACTION
OS_AXIS: 117
OD_AXIS: 055
OS_CYLINDER: -0.25
OD_SPHERE: -0.50
OS_SPHERE: -0.25
OD_CYLINDER: -0.50

## 2025-04-24 ASSESSMENT — VISUAL ACUITY
OS_BCVA: 20/30
OD_BCVA: 20/40-1

## 2025-05-06 ENCOUNTER — TELEHEALTH-HOME (OUTPATIENT)
Facility: LOCATION | Age: 75
Setting detail: OPHTHALMOLOGY
End: 2025-05-06
Payer: MEDICAID

## 2025-05-06 DIAGNOSIS — C44.1292: ICD-10-CM

## 2025-05-06 DIAGNOSIS — H02.825: ICD-10-CM

## 2025-05-06 PROBLEM — H01.004 BLEPHARITIS; RIGHT UPPER LID, RIGHT LOWER LID, LEFT UPPER LID, LEFT LOWER LID: Status: ACTIVE | Noted: 2025-05-06

## 2025-05-06 PROBLEM — H01.002 BLEPHARITIS; RIGHT UPPER LID, RIGHT LOWER LID, LEFT UPPER LID, LEFT LOWER LID: Status: ACTIVE | Noted: 2025-05-06

## 2025-05-06 PROBLEM — H01.005 BLEPHARITIS; RIGHT UPPER LID, RIGHT LOWER LID, LEFT UPPER LID, LEFT LOWER LID: Status: ACTIVE | Noted: 2025-05-06

## 2025-05-06 PROBLEM — H01.001 BLEPHARITIS; RIGHT UPPER LID, RIGHT LOWER LID, LEFT UPPER LID, LEFT LOWER LID: Status: ACTIVE | Noted: 2025-05-06

## 2025-05-06 PROCEDURE — 98016 BRIEF COMUNICAJ TECH-BSD SVC: CPT | Mod: 93 | Performed by: OPHTHALMOLOGY

## 2025-05-06 ASSESSMENT — LID EXAM ASSESSMENTS
OD_BLEPHARITIS: RLL RUL
OS_BLEPHARITIS: LLL LUL

## 2025-05-06 ASSESSMENT — KERATOMETRY
OS_K2POWER_DIOPTERS: 45.75
OD_AXISANGLE_DEGREES: 112
OS_K1POWER_DIOPTERS: 45.00
OD_K2POWER_DIOPTERS: 46.00
OD_K1POWER_DIOPTERS: 45.25
OS_AXISANGLE_DEGREES: 072
METHOD_AUTO_MANUAL: AUTO

## 2025-05-06 ASSESSMENT — REFRACTION_AUTOREFRACTION
OD_AXIS: 055
OS_CYLINDER: -0.25
OS_AXIS: 117
OS_SPHERE: -0.25
OD_CYLINDER: -0.50
OD_SPHERE: -0.50

## 2025-05-06 ASSESSMENT — SUPERFICIAL PUNCTATE KERATITIS (SPK)
OD_SPK: T
OS_SPK: T

## 2025-05-06 ASSESSMENT — REFRACTION_CURRENTRX
OD_OVR_VA: 20/
OS_VPRISM_DIRECTION: BF
OS_ADD: +3.00
OS_SPHERE: PLANO
OD_SPHERE: PLANO
OS_OVR_VA: 20/
OD_ADD: +3.25
OD_VPRISM_DIRECTION: BF

## 2025-05-06 ASSESSMENT — VISUAL ACUITY
OD_BCVA: 20/40-1
OS_BCVA: 20/30

## 2025-05-06 ASSESSMENT — CORNEAL PTERYGIUM: OD_PTERYGIUM: TEMPORAL 1MM

## 2025-05-15 ENCOUNTER — EMERGENCY (EMERGENCY)
Facility: HOSPITAL | Age: 75
LOS: 1 days | End: 2025-05-15
Attending: EMERGENCY MEDICINE
Payer: MEDICAID

## 2025-05-15 VITALS
HEART RATE: 77 BPM | OXYGEN SATURATION: 97 % | RESPIRATION RATE: 18 BRPM | DIASTOLIC BLOOD PRESSURE: 88 MMHG | TEMPERATURE: 98 F | SYSTOLIC BLOOD PRESSURE: 175 MMHG

## 2025-05-15 VITALS
TEMPERATURE: 99 F | SYSTOLIC BLOOD PRESSURE: 174 MMHG | DIASTOLIC BLOOD PRESSURE: 84 MMHG | HEART RATE: 77 BPM | OXYGEN SATURATION: 97 % | WEIGHT: 161.16 LBS | HEIGHT: 62 IN | RESPIRATION RATE: 16 BRPM

## 2025-05-15 LAB
ALBUMIN SERPL ELPH-MCNC: 4.5 G/DL — SIGNIFICANT CHANGE UP (ref 3.3–5.2)
ALP SERPL-CCNC: 72 U/L — SIGNIFICANT CHANGE UP (ref 40–120)
ALT FLD-CCNC: 12 U/L — SIGNIFICANT CHANGE UP
ANION GAP SERPL CALC-SCNC: 16 MMOL/L — SIGNIFICANT CHANGE UP (ref 5–17)
APTT BLD: 30.3 SEC — SIGNIFICANT CHANGE UP (ref 26.1–36.8)
AST SERPL-CCNC: 23 U/L — SIGNIFICANT CHANGE UP
BASOPHILS # BLD AUTO: 0.03 K/UL — SIGNIFICANT CHANGE UP (ref 0–0.2)
BASOPHILS NFR BLD AUTO: 0.4 % — SIGNIFICANT CHANGE UP (ref 0–2)
BILIRUB SERPL-MCNC: 0.3 MG/DL — LOW (ref 0.4–2)
BUN SERPL-MCNC: 7 MG/DL — LOW (ref 8–20)
CALCIUM SERPL-MCNC: 9.2 MG/DL — SIGNIFICANT CHANGE UP (ref 8.4–10.5)
CHLORIDE SERPL-SCNC: 96 MMOL/L — SIGNIFICANT CHANGE UP (ref 96–108)
CO2 SERPL-SCNC: 23 MMOL/L — SIGNIFICANT CHANGE UP (ref 22–29)
CREAT SERPL-MCNC: 0.47 MG/DL — LOW (ref 0.5–1.3)
EGFR: 100 ML/MIN/1.73M2 — SIGNIFICANT CHANGE UP
EGFR: 100 ML/MIN/1.73M2 — SIGNIFICANT CHANGE UP
EOSINOPHIL # BLD AUTO: 0.14 K/UL — SIGNIFICANT CHANGE UP (ref 0–0.5)
EOSINOPHIL NFR BLD AUTO: 1.7 % — SIGNIFICANT CHANGE UP (ref 0–6)
GLUCOSE SERPL-MCNC: 67 MG/DL — LOW (ref 70–99)
HCT VFR BLD CALC: 36.3 % — SIGNIFICANT CHANGE UP (ref 34.5–45)
HGB BLD-MCNC: 12.2 G/DL — SIGNIFICANT CHANGE UP (ref 11.5–15.5)
IMM GRANULOCYTES # BLD AUTO: 0.03 K/UL — SIGNIFICANT CHANGE UP (ref 0–0.07)
IMM GRANULOCYTES NFR BLD AUTO: 0.4 % — SIGNIFICANT CHANGE UP (ref 0–0.9)
INR BLD: 0.94 RATIO — SIGNIFICANT CHANGE UP (ref 0.85–1.16)
LYMPHOCYTES # BLD AUTO: 2.54 K/UL — SIGNIFICANT CHANGE UP (ref 1–3.3)
LYMPHOCYTES NFR BLD AUTO: 30.6 % — SIGNIFICANT CHANGE UP (ref 13–44)
MCHC RBC-ENTMCNC: 29.9 PG — SIGNIFICANT CHANGE UP (ref 27–34)
MCHC RBC-ENTMCNC: 33.6 G/DL — SIGNIFICANT CHANGE UP (ref 32–36)
MCV RBC AUTO: 89 FL — SIGNIFICANT CHANGE UP (ref 80–100)
MONOCYTES # BLD AUTO: 0.47 K/UL — SIGNIFICANT CHANGE UP (ref 0–0.9)
MONOCYTES NFR BLD AUTO: 5.7 % — SIGNIFICANT CHANGE UP (ref 2–14)
NEUTROPHILS # BLD AUTO: 5.08 K/UL — SIGNIFICANT CHANGE UP (ref 1.8–7.4)
NEUTROPHILS NFR BLD AUTO: 61.2 % — SIGNIFICANT CHANGE UP (ref 43–77)
NRBC # BLD AUTO: 0 K/UL — SIGNIFICANT CHANGE UP (ref 0–0)
NRBC # FLD: 0 K/UL — SIGNIFICANT CHANGE UP (ref 0–0)
NRBC BLD AUTO-RTO: 0 /100 WBCS — SIGNIFICANT CHANGE UP (ref 0–0)
PLATELET # BLD AUTO: 269 K/UL — SIGNIFICANT CHANGE UP (ref 150–400)
PMV BLD: 10.4 FL — SIGNIFICANT CHANGE UP (ref 7–13)
POTASSIUM SERPL-MCNC: 4.1 MMOL/L — SIGNIFICANT CHANGE UP (ref 3.5–5.3)
POTASSIUM SERPL-SCNC: 4.1 MMOL/L — SIGNIFICANT CHANGE UP (ref 3.5–5.3)
PROT SERPL-MCNC: 7.6 G/DL — SIGNIFICANT CHANGE UP (ref 6.6–8.7)
PROTHROM AB SERPL-ACNC: 10.9 SEC — SIGNIFICANT CHANGE UP (ref 9.9–13.4)
RBC # BLD: 4.08 M/UL — SIGNIFICANT CHANGE UP (ref 3.8–5.2)
RBC # FLD: 13 % — SIGNIFICANT CHANGE UP (ref 10.3–14.5)
SODIUM SERPL-SCNC: 135 MMOL/L — SIGNIFICANT CHANGE UP (ref 135–145)
WBC # BLD: 8.29 K/UL — SIGNIFICANT CHANGE UP (ref 3.8–10.5)
WBC # FLD AUTO: 8.29 K/UL — SIGNIFICANT CHANGE UP (ref 3.8–10.5)

## 2025-05-15 PROCEDURE — 99285 EMERGENCY DEPT VISIT HI MDM: CPT | Mod: 25

## 2025-05-15 PROCEDURE — 36415 COLL VENOUS BLD VENIPUNCTURE: CPT

## 2025-05-15 PROCEDURE — 93010 ELECTROCARDIOGRAM REPORT: CPT

## 2025-05-15 PROCEDURE — 73130 X-RAY EXAM OF HAND: CPT

## 2025-05-15 PROCEDURE — 73562 X-RAY EXAM OF KNEE 3: CPT

## 2025-05-15 PROCEDURE — 73130 X-RAY EXAM OF HAND: CPT | Mod: 26,RT

## 2025-05-15 PROCEDURE — 73562 X-RAY EXAM OF KNEE 3: CPT | Mod: 26,RT

## 2025-05-15 PROCEDURE — 72125 CT NECK SPINE W/O DYE: CPT

## 2025-05-15 PROCEDURE — 29125 APPL SHORT ARM SPLINT STATIC: CPT | Mod: RT

## 2025-05-15 PROCEDURE — 85610 PROTHROMBIN TIME: CPT

## 2025-05-15 PROCEDURE — 72125 CT NECK SPINE W/O DYE: CPT | Mod: 26

## 2025-05-15 PROCEDURE — 85025 COMPLETE CBC W/AUTO DIFF WBC: CPT

## 2025-05-15 PROCEDURE — 70450 CT HEAD/BRAIN W/O DYE: CPT | Mod: 26

## 2025-05-15 PROCEDURE — 70450 CT HEAD/BRAIN W/O DYE: CPT

## 2025-05-15 PROCEDURE — 93005 ELECTROCARDIOGRAM TRACING: CPT

## 2025-05-15 PROCEDURE — T1013: CPT

## 2025-05-15 PROCEDURE — 80053 COMPREHEN METABOLIC PANEL: CPT

## 2025-05-15 PROCEDURE — 85730 THROMBOPLASTIN TIME PARTIAL: CPT

## 2025-05-15 NOTE — ED PROVIDER NOTE - PROGRESS NOTE DETAILS
Pt signed out pending CT. CT negative, xrays negative on my read, pt has some R. snuffbox tenderness on my exam, placed in thumb spica splint for f/u w ortho.

## 2025-05-15 NOTE — ED PROVIDER NOTE - PHYSICAL EXAMINATION
Gen: Alert, NAD  Head: NC, AT, PERRL, EOMI, normal lids/conjunctiva  Neck: +supple, no tenderness/meningismus/JVD, +Trachea midline  Pulm: Bilateral BS, normal resp effort, no wheeze/stridor/retractions  CV: RRR, no M/R/G, +dist pulses  CHEST WALL:   Abd: soft, NT/ND, +BS, no hepatosplenomegaly  Mskel:    L UE: from/nt @ shoulder / elbow / wrist / hand   R UE: from/nt @ shoulder / elbow / wrist / ttp @ radial hand.    L LE: from/nt @ hip / knee / ankle   R LE: from/nt @ hip / TTP @ anterior knee (from @ knee) / ankle   distal pulses intact  BACK: nt midline c/t/l/s spine.   Skin: no rash  Neuro: AAOx3, no sensory/motor deficit

## 2025-05-15 NOTE — ED PROVIDER NOTE - CLINICAL SUMMARY MEDICAL DECISION MAKING FREE TEXT BOX
75 yoF; with PMH significant for HTN, HLD, DM; now presenting with fall s/p acute onset of dizziness 3 days ago.  Patient stated she was sweeping and suddenly felt dizzy and fell to the ground onto her right side.  Patient complaining of right knee pain and right hand pain.  Positive head trauma.  Denies LOC.  Complaining of headache.  Complaining of neck pain.  Denies nausea or vomiting.  Denies numbness or tingling.  Denies any focal weakness.  Denies chest pain, shortness breath, palpitations prior to the fall. will do labs to eval dizziness, ct head, xray knee, xray ankle, re-eval.

## 2025-05-15 NOTE — ED ADULT TRIAGE NOTE - CHIEF COMPLAINT QUOTE
c/o neck, back and R wrist  pain s/p  fall 2 days ago. Bruising noted to R hand.  + head strike, -LOC. No use of anticoags,

## 2025-05-15 NOTE — ED PROVIDER NOTE - OBJECTIVE STATEMENT
75 yoF; with PMH significant for HTN, HLD, DM; now presenting with fall s/p acute onset of dizziness 3 days ago.  Patient stated she was sweeping and suddenly felt dizzy and fell to the ground onto her right side.  Patient complaining of right knee pain and right hand pain.  Positive head trauma.  Denies LOC.  Complaining of headache.  Complaining of neck pain.  Denies nausea or vomiting.  Denies numbness or tingling.  Denies any focal weakness.  Denies chest pain, shortness breath, palpitations prior to the fall.

## 2025-05-15 NOTE — ED PROVIDER NOTE - PATIENT PORTAL LINK FT
You can access the FollowMyHealth Patient Portal offered by [facility] by registering at the following website: [email or web address]. By joining [facility]’s FollowMyHealth portal, you will also be able to view your health information using other applications (apps) compatible with our system.

## 2025-05-15 NOTE — ED ADULT NURSE NOTE - NSFALLHARMRISKINTERV_ED_ALL_ED

## 2025-05-15 NOTE — ED PROVIDER NOTE - CARE PROVIDER_API CALL
Macario Atkinson  Orthopaedic Trauma  [address] [place] [address]  Phone: [phone]  Fax: [phone]  Follow Up Time: 4-6 Days

## 2025-05-15 NOTE — ED ADULT NURSE NOTE - OBJECTIVE STATEMENT
pt states 2 days ago she was taking that garbage out and fell. Pt states she didn't " feel well and almost passed out" + headstrike. Pt denies anticoagulants.hx dm, hypothyroid. Denies cardiac HX    pt complains of head and neck pain

## 2025-05-27 PROBLEM — Z78.0 HISTORY OF MENOPAUSE: Status: RESOLVED | Noted: 2025-05-27 | Resolved: 2025-05-27

## 2025-05-27 PROBLEM — Z86.39 HISTORY OF HYPERLIPIDEMIA: Status: RESOLVED | Noted: 2025-05-27 | Resolved: 2025-05-27

## 2025-05-27 PROBLEM — R11.10 INTERMITTENT VOMITING: Status: RESOLVED | Noted: 2025-05-27 | Resolved: 2025-05-27

## 2025-05-27 PROBLEM — E53.8 VITAMIN B12 DEFICIENCY: Status: RESOLVED | Noted: 2025-05-27 | Resolved: 2025-05-27

## 2025-05-27 PROBLEM — Z86.39 HISTORY OF HYPOTHYROIDISM: Status: RESOLVED | Noted: 2025-05-27 | Resolved: 2025-05-27

## 2025-07-09 ENCOUNTER — APPOINTMENT (OUTPATIENT)
Dept: RHEUMATOLOGY | Facility: CLINIC | Age: 75
End: 2025-07-09